# Patient Record
Sex: FEMALE | Race: WHITE | NOT HISPANIC OR LATINO | Employment: OTHER | URBAN - METROPOLITAN AREA
[De-identification: names, ages, dates, MRNs, and addresses within clinical notes are randomized per-mention and may not be internally consistent; named-entity substitution may affect disease eponyms.]

---

## 2021-06-24 DIAGNOSIS — Z11.59 ENCOUNTER FOR SCREENING FOR OTHER VIRAL DISEASES: ICD-10-CM

## 2021-06-25 DIAGNOSIS — Z11.59 ENCOUNTER FOR SCREENING FOR OTHER VIRAL DISEASES: ICD-10-CM

## 2021-06-25 LAB
LABORATORY COMMENT REPORT: NORMAL
SARS-COV-2 RNA RESP QL NAA+PROBE: NEGATIVE
SARS-COV-2 RNA RESP QL NAA+PROBE: NORMAL
SPECIMEN SOURCE: NORMAL
SPECIMEN SOURCE: NORMAL

## 2021-06-25 PROCEDURE — U0005 INFEC AGEN DETEC AMPLI PROBE: HCPCS | Performed by: ORTHOPAEDIC SURGERY

## 2021-06-25 PROCEDURE — U0003 INFECTIOUS AGENT DETECTION BY NUCLEIC ACID (DNA OR RNA); SEVERE ACUTE RESPIRATORY SYNDROME CORONAVIRUS 2 (SARS-COV-2) (CORONAVIRUS DISEASE [COVID-19]), AMPLIFIED PROBE TECHNIQUE, MAKING USE OF HIGH THROUGHPUT TECHNOLOGIES AS DESCRIBED BY CMS-2020-01-R: HCPCS | Performed by: ORTHOPAEDIC SURGERY

## 2021-06-25 RX ORDER — LORAZEPAM 0.5 MG/1
0.5 TABLET ORAL
COMMUNITY
End: 2024-03-11

## 2021-06-25 RX ORDER — CLOPIDOGREL BISULFATE 75 MG/1
75 TABLET ORAL DAILY
COMMUNITY
End: 2024-03-11

## 2021-06-25 RX ORDER — CHLORAL HYDRATE 500 MG
3 CAPSULE ORAL DAILY
COMMUNITY
End: 2024-03-11

## 2021-06-25 RX ORDER — VITAMIN B COMPLEX
TABLET ORAL DAILY
COMMUNITY

## 2021-06-25 RX ORDER — LOSARTAN POTASSIUM 100 MG/1
100 TABLET ORAL DAILY
Status: ON HOLD | COMMUNITY
End: 2021-06-30

## 2021-06-25 RX ORDER — DESONIDE 0.5 MG/G
CREAM TOPICAL 2 TIMES DAILY PRN
COMMUNITY
End: 2024-03-11

## 2021-06-25 RX ORDER — BUSPIRONE HYDROCHLORIDE 15 MG/1
15 TABLET ORAL 3 TIMES DAILY PRN
COMMUNITY

## 2021-06-25 RX ORDER — BUTALBITAL, ACETAMINOPHEN AND CAFFEINE 50; 325; 40 MG/1; MG/1; MG/1
1 TABLET ORAL EVERY 4 HOURS PRN
COMMUNITY

## 2021-06-25 RX ORDER — ESTRADIOL 0.1 MG/G
2 CREAM VAGINAL
COMMUNITY

## 2021-06-25 RX ORDER — ALBUTEROL SULFATE 90 UG/1
2 AEROSOL, METERED RESPIRATORY (INHALATION) 4 TIMES DAILY PRN
COMMUNITY

## 2021-06-25 RX ORDER — ATENOLOL 50 MG/1
50 TABLET ORAL EVERY EVENING
COMMUNITY

## 2021-06-25 RX ORDER — NITROGLYCERIN 0.4 MG/1
0.4 TABLET SUBLINGUAL EVERY 5 MIN PRN
COMMUNITY

## 2021-06-25 RX ORDER — TRIMETHOPRIM 100 MG/1
100 TABLET ORAL DAILY
COMMUNITY

## 2021-06-25 RX ORDER — TRAMADOL HYDROCHLORIDE 50 MG/1
50 TABLET ORAL EVERY 6 HOURS PRN
Status: ON HOLD | COMMUNITY
End: 2021-06-30

## 2021-06-25 RX ORDER — ASPIRIN 81 MG/1
81 TABLET ORAL EVERY EVENING
Status: ON HOLD | COMMUNITY
End: 2021-06-30

## 2021-06-25 RX ORDER — FAMOTIDINE 40 MG/1
40 TABLET, FILM COATED ORAL EVERY EVENING
COMMUNITY

## 2021-06-28 RX ORDER — GABAPENTIN 300 MG/1
600 CAPSULE ORAL AT BEDTIME
COMMUNITY
End: 2024-03-11

## 2021-06-28 NOTE — PROGRESS NOTES
PTA medications updated by Medication Scribe prior to surgery via phone call with patient (last doses completed by Nurse)     Medication history sources: Patient, Surescripts and H&P  In the past week, patient estimated taking medication this percent of the time: Greater than 90%  Adherence assessment: N/A Not Observed    Significant changes made to the medication list:  None      Additional medication history information:   Patient brought own home meds: Albuterol inhaler, Advair inhaler    Medication reconciliation completed by provider prior to medication history? No    Time spent in this activity: 20 minutes    The information provided in this note is only as accurate as the sources available at the time of update(s)      Prior to Admission medications    Medication Sig Last Dose Taking? Auth Provider   albuterol (PROAIR HFA/PROVENTIL HFA/VENTOLIN HFA) 108 (90 Base) MCG/ACT inhaler Inhale 2 puffs into the lungs 4 times daily as needed for shortness of breath / dyspnea or wheezing  at prn Yes Reported, Patient   aluminum chloride (DRYSOL) 20 % external solution Apply topically daily as needed   at prn Yes Reported, Patient   aspirin 81 MG EC tablet Take 81 mg by mouth every evening  6/22/2021 Yes Reported, Patient   atenolol (TENORMIN) 50 MG tablet Take 50 mg by mouth every evening   at pm Yes Reported, Patient   busPIRone (BUSPAR) 15 MG tablet Take 15 mg by mouth 3 times daily as needed  at prn Yes Reported, Patient   butalbital-acetaminophen-caffeine (ESGIC) -40 MG tablet Take 1 tablet by mouth every 4 hours as needed for headaches  at prn Yes Reported, Patient   CALCIUM-VITAMIN D PO Take 2 tablets by mouth daily 6/22/2021 Yes Reported, Patient   clopidogrel (PLAVIX) 75 MG tablet Take 75 mg by mouth daily 6/22/2021 Yes Reported, Patient   desonide (DESOWEN) 0.05 % external cream Apply topically 2 times daily as needed  at prn Yes Reported, Patient   estradiol (ESTRACE) 0.1 MG/GM vaginal cream Place 2 g  vaginally twice a week 6/22/2021 Yes Reported, Patient   famotidine (PEPCID) 40 MG tablet Take 40 mg by mouth every evening   at pm Yes Reported, Patient   fish oil-omega-3 fatty acids 1000 MG capsule Take 3 g by mouth daily 6/22/2021 Yes Reported, Patient   fluticasone-salmeterol (ADVAIR) 250-50 MCG/DOSE inhaler Inhale 1 puff into the lungs every morning   at am Yes Reported, Patient   gabapentin (NEURONTIN) 300 MG capsule Take 600 mg by mouth At Bedtime (2 x 300mg)  at hs Yes Reported, Patient   LORazepam (ATIVAN) 0.5 MG tablet Take 0.5 mg by mouth nightly as needed   at prn Yes Reported, Patient   losartan (COZAAR) 100 MG tablet Take 100 mg by mouth daily  at am Yes Reported, Patient   methylcellulose (CITRUCEL) 500 MG TABS tablet Take 1,000 mg by mouth daily 6/22/2021 Yes Reported, Patient   nitroGLYcerin (NITROSTAT) 0.4 MG sublingual tablet Place 0.4 mg under the tongue every 5 minutes as needed for chest pain For chest pain place 1 tablet under the tongue every 5 minutes for 3 doses. If symptoms persist 5 minutes after 1st dose call 911.  at prn Yes Reported, Patient   traMADol (ULTRAM) 50 MG tablet Take 50 mg by mouth every 6 hours as needed for severe pain  at prn Yes Reported, Patient   trimethoprim (TRIMPEX) 100 MG tablet Take 100 mg by mouth daily  at am Yes Reported, Patient   Vitamin D3 (CHOLECALCIFEROL) 25 mcg (1000 units) tablet Take by mouth daily 6/22/2021 Yes Reported, Patient

## 2021-06-29 ENCOUNTER — ANESTHESIA (OUTPATIENT)
Dept: SURGERY | Facility: CLINIC | Age: 81
End: 2021-06-29
Payer: COMMERCIAL

## 2021-06-29 ENCOUNTER — ANESTHESIA EVENT (OUTPATIENT)
Dept: SURGERY | Facility: CLINIC | Age: 81
End: 2021-06-29
Payer: COMMERCIAL

## 2021-06-29 ENCOUNTER — HOSPITAL ENCOUNTER (OUTPATIENT)
Facility: CLINIC | Age: 81
Discharge: HOME | End: 2021-06-30
Attending: ORTHOPAEDIC SURGERY | Admitting: ORTHOPAEDIC SURGERY
Payer: COMMERCIAL

## 2021-06-29 ENCOUNTER — APPOINTMENT (OUTPATIENT)
Dept: PHYSICAL THERAPY | Facility: CLINIC | Age: 81
End: 2021-06-29
Attending: ORTHOPAEDIC SURGERY
Payer: COMMERCIAL

## 2021-06-29 ENCOUNTER — APPOINTMENT (OUTPATIENT)
Dept: GENERAL RADIOLOGY | Facility: CLINIC | Age: 81
End: 2021-06-29
Attending: ORTHOPAEDIC SURGERY
Payer: COMMERCIAL

## 2021-06-29 DIAGNOSIS — I10 BENIGN ESSENTIAL HYPERTENSION: ICD-10-CM

## 2021-06-29 DIAGNOSIS — Z96.642 STATUS POST TOTAL HIP REPLACEMENT, LEFT: Primary | ICD-10-CM

## 2021-06-29 DIAGNOSIS — Z96.642 STATUS POST TOTAL REPLACEMENT OF LEFT HIP: ICD-10-CM

## 2021-06-29 LAB
ABO + RH BLD: NORMAL
ABO + RH BLD: NORMAL
BLD GP AB SCN SERPL QL: NORMAL
BLOOD BANK CMNT PATIENT-IMP: NORMAL
CREAT SERPL-MCNC: 0.89 MG/DL (ref 0.52–1.04)
GFR SERPL CREATININE-BSD FRML MDRD: 60 ML/MIN/{1.73_M2}
POTASSIUM SERPL-SCNC: 4.2 MMOL/L (ref 3.4–5.3)
SPECIMEN EXP DATE BLD: NORMAL

## 2021-06-29 PROCEDURE — 86901 BLOOD TYPING SEROLOGIC RH(D): CPT | Performed by: ANESTHESIOLOGY

## 2021-06-29 PROCEDURE — 36415 COLL VENOUS BLD VENIPUNCTURE: CPT | Performed by: ANESTHESIOLOGY

## 2021-06-29 PROCEDURE — 999N000141 HC STATISTIC PRE-PROCEDURE NURSING ASSESSMENT: Performed by: ORTHOPAEDIC SURGERY

## 2021-06-29 PROCEDURE — 250N000011 HC RX IP 250 OP 636: Performed by: REGISTERED NURSE

## 2021-06-29 PROCEDURE — 99219 PR INITIAL OBSERVATION CARE,LEVEL II: CPT | Performed by: PHYSICIAN ASSISTANT

## 2021-06-29 PROCEDURE — 370N000017 HC ANESTHESIA TECHNICAL FEE, PER MIN: Performed by: ORTHOPAEDIC SURGERY

## 2021-06-29 PROCEDURE — 258N000003 HC RX IP 258 OP 636: Performed by: PHYSICIAN ASSISTANT

## 2021-06-29 PROCEDURE — 86850 RBC ANTIBODY SCREEN: CPT | Performed by: ANESTHESIOLOGY

## 2021-06-29 PROCEDURE — 82565 ASSAY OF CREATININE: CPT | Performed by: ANESTHESIOLOGY

## 2021-06-29 PROCEDURE — 97161 PT EVAL LOW COMPLEX 20 MIN: CPT | Mod: GP | Performed by: PHYSICAL THERAPIST

## 2021-06-29 PROCEDURE — 258N000003 HC RX IP 258 OP 636: Performed by: ORTHOPAEDIC SURGERY

## 2021-06-29 PROCEDURE — 97116 GAIT TRAINING THERAPY: CPT | Mod: GP | Performed by: PHYSICAL THERAPIST

## 2021-06-29 PROCEDURE — 250N000011 HC RX IP 250 OP 636: Performed by: ORTHOPAEDIC SURGERY

## 2021-06-29 PROCEDURE — 250N000011 HC RX IP 250 OP 636: Performed by: ANESTHESIOLOGY

## 2021-06-29 PROCEDURE — 250N000009 HC RX 250: Performed by: ORTHOPAEDIC SURGERY

## 2021-06-29 PROCEDURE — 258N000003 HC RX IP 258 OP 636: Performed by: REGISTERED NURSE

## 2021-06-29 PROCEDURE — 258N000001 HC RX 258: Performed by: ORTHOPAEDIC SURGERY

## 2021-06-29 PROCEDURE — 710N000009 HC RECOVERY PHASE 1, LEVEL 1, PER MIN: Performed by: ORTHOPAEDIC SURGERY

## 2021-06-29 PROCEDURE — 250N000009 HC RX 250: Performed by: REGISTERED NURSE

## 2021-06-29 PROCEDURE — 97530 THERAPEUTIC ACTIVITIES: CPT | Mod: GP | Performed by: PHYSICAL THERAPIST

## 2021-06-29 PROCEDURE — 99207 PR CONSULT E&M CHANGED TO INITIAL LEVEL: CPT | Performed by: PHYSICIAN ASSISTANT

## 2021-06-29 PROCEDURE — 84132 ASSAY OF SERUM POTASSIUM: CPT | Performed by: ANESTHESIOLOGY

## 2021-06-29 PROCEDURE — C1713 ANCHOR/SCREW BN/BN,TIS/BN: HCPCS | Performed by: ORTHOPAEDIC SURGERY

## 2021-06-29 PROCEDURE — 272N000001 HC OR GENERAL SUPPLY STERILE: Performed by: ORTHOPAEDIC SURGERY

## 2021-06-29 PROCEDURE — C1776 JOINT DEVICE (IMPLANTABLE): HCPCS | Performed by: ORTHOPAEDIC SURGERY

## 2021-06-29 PROCEDURE — 360N000077 HC SURGERY LEVEL 4, PER MIN: Performed by: ORTHOPAEDIC SURGERY

## 2021-06-29 PROCEDURE — 250N000013 HC RX MED GY IP 250 OP 250 PS 637: Performed by: PHYSICIAN ASSISTANT

## 2021-06-29 PROCEDURE — 250N000011 HC RX IP 250 OP 636: Performed by: PHYSICIAN ASSISTANT

## 2021-06-29 PROCEDURE — 86900 BLOOD TYPING SEROLOGIC ABO: CPT | Performed by: ANESTHESIOLOGY

## 2021-06-29 PROCEDURE — 999N000063 XR PELVIS AND HIP PORTABLE LEFT 1 VIEW

## 2021-06-29 PROCEDURE — 97110 THERAPEUTIC EXERCISES: CPT | Mod: GP | Performed by: PHYSICAL THERAPIST

## 2021-06-29 PROCEDURE — 258N000003 HC RX IP 258 OP 636: Performed by: ANESTHESIOLOGY

## 2021-06-29 PROCEDURE — 250N000013 HC RX MED GY IP 250 OP 250 PS 637: Performed by: ORTHOPAEDIC SURGERY

## 2021-06-29 DEVICE — IMP SHELL SNR ACET R3 3H 50MM 71335550: Type: IMPLANTABLE DEVICE | Site: HIP | Status: FUNCTIONAL

## 2021-06-29 DEVICE — REFLECTION SPHERICAL HEAD SCREW 25MM
Type: IMPLANTABLE DEVICE | Site: HIP | Status: FUNCTIONAL
Brand: REFLECTION

## 2021-06-29 DEVICE — R3 +4MM XLPE LINER 36MM ID X 50MM OD
Type: IMPLANTABLE DEVICE | Site: HIP | Status: FUNCTIONAL
Brand: R3

## 2021-06-29 DEVICE — IMPLANTABLE DEVICE: Type: IMPLANTABLE DEVICE | Site: HIP | Status: FUNCTIONAL

## 2021-06-29 DEVICE — IMP HEAD FEMORAL SNR COBALT 36MM -3 71303603: Type: IMPLANTABLE DEVICE | Site: HIP | Status: FUNCTIONAL

## 2021-06-29 RX ORDER — ASPIRIN 81 MG/1
81 TABLET ORAL 2 TIMES DAILY
Status: DISCONTINUED | OUTPATIENT
Start: 2021-06-29 | End: 2021-06-30

## 2021-06-29 RX ORDER — HYDROMORPHONE HYDROCHLORIDE 4 MG/1
4 TABLET ORAL EVERY 4 HOURS PRN
Status: DISCONTINUED | OUTPATIENT
Start: 2021-06-29 | End: 2021-06-30 | Stop reason: HOSPADM

## 2021-06-29 RX ORDER — ACETAMINOPHEN 325 MG/1
650 TABLET ORAL EVERY 4 HOURS PRN
Qty: 100 TABLET | Refills: 0 | Status: ON HOLD | OUTPATIENT
Start: 2021-06-29 | End: 2024-03-12

## 2021-06-29 RX ORDER — ONDANSETRON 4 MG/1
4 TABLET, ORALLY DISINTEGRATING ORAL EVERY 30 MIN PRN
Status: DISCONTINUED | OUTPATIENT
Start: 2021-06-29 | End: 2021-06-29 | Stop reason: HOSPADM

## 2021-06-29 RX ORDER — NALOXONE HYDROCHLORIDE 0.4 MG/ML
0.4 INJECTION, SOLUTION INTRAMUSCULAR; INTRAVENOUS; SUBCUTANEOUS
Status: DISCONTINUED | OUTPATIENT
Start: 2021-06-29 | End: 2021-06-30 | Stop reason: HOSPADM

## 2021-06-29 RX ORDER — GABAPENTIN 300 MG/1
600 CAPSULE ORAL AT BEDTIME
Status: DISCONTINUED | OUTPATIENT
Start: 2021-06-29 | End: 2021-06-30 | Stop reason: HOSPADM

## 2021-06-29 RX ORDER — HYDROXYZINE HYDROCHLORIDE 10 MG/1
10 TABLET, FILM COATED ORAL EVERY 6 HOURS PRN
Status: DISCONTINUED | OUTPATIENT
Start: 2021-06-29 | End: 2021-06-30 | Stop reason: HOSPADM

## 2021-06-29 RX ORDER — AMOXICILLIN 250 MG
1 CAPSULE ORAL 2 TIMES DAILY
Status: DISCONTINUED | OUTPATIENT
Start: 2021-06-29 | End: 2021-06-30 | Stop reason: HOSPADM

## 2021-06-29 RX ORDER — CEFAZOLIN SODIUM 2 G/100ML
2 INJECTION, SOLUTION INTRAVENOUS
Status: COMPLETED | OUTPATIENT
Start: 2021-06-29 | End: 2021-06-29

## 2021-06-29 RX ORDER — ACETAMINOPHEN 325 MG/1
975 TABLET ORAL EVERY 8 HOURS
Status: DISCONTINUED | OUTPATIENT
Start: 2021-06-29 | End: 2021-06-30 | Stop reason: HOSPADM

## 2021-06-29 RX ORDER — BUSPIRONE HYDROCHLORIDE 15 MG/1
15 TABLET ORAL 3 TIMES DAILY
Status: DISCONTINUED | OUTPATIENT
Start: 2021-06-29 | End: 2021-06-30 | Stop reason: HOSPADM

## 2021-06-29 RX ORDER — MAGNESIUM HYDROXIDE 1200 MG/15ML
LIQUID ORAL PRN
Status: DISCONTINUED | OUTPATIENT
Start: 2021-06-29 | End: 2021-06-29 | Stop reason: HOSPADM

## 2021-06-29 RX ORDER — ATENOLOL 50 MG/1
50 TABLET ORAL EVERY EVENING
Status: DISCONTINUED | OUTPATIENT
Start: 2021-06-29 | End: 2021-06-30 | Stop reason: HOSPADM

## 2021-06-29 RX ORDER — FAMOTIDINE 20 MG/1
40 TABLET, FILM COATED ORAL EVERY EVENING
Status: DISCONTINUED | OUTPATIENT
Start: 2021-06-29 | End: 2021-06-30 | Stop reason: HOSPADM

## 2021-06-29 RX ORDER — SODIUM CHLORIDE, SODIUM LACTATE, POTASSIUM CHLORIDE, CALCIUM CHLORIDE 600; 310; 30; 20 MG/100ML; MG/100ML; MG/100ML; MG/100ML
INJECTION, SOLUTION INTRAVENOUS CONTINUOUS
Status: DISCONTINUED | OUTPATIENT
Start: 2021-06-29 | End: 2021-06-29 | Stop reason: HOSPADM

## 2021-06-29 RX ORDER — NALOXONE HYDROCHLORIDE 0.4 MG/ML
0.2 INJECTION, SOLUTION INTRAMUSCULAR; INTRAVENOUS; SUBCUTANEOUS
Status: DISCONTINUED | OUTPATIENT
Start: 2021-06-29 | End: 2021-06-30 | Stop reason: HOSPADM

## 2021-06-29 RX ORDER — LOSARTAN POTASSIUM 100 MG/1
100 TABLET ORAL DAILY
Status: DISCONTINUED | OUTPATIENT
Start: 2021-06-30 | End: 2021-06-30 | Stop reason: HOSPADM

## 2021-06-29 RX ORDER — ONDANSETRON 4 MG/1
4 TABLET, ORALLY DISINTEGRATING ORAL EVERY 6 HOURS PRN
Status: DISCONTINUED | OUTPATIENT
Start: 2021-06-29 | End: 2021-06-30 | Stop reason: HOSPADM

## 2021-06-29 RX ORDER — DOCUSATE SODIUM 100 MG/1
100 CAPSULE, LIQUID FILLED ORAL 2 TIMES DAILY
Status: DISCONTINUED | OUTPATIENT
Start: 2021-06-29 | End: 2021-06-30 | Stop reason: HOSPADM

## 2021-06-29 RX ORDER — LORAZEPAM 0.5 MG/1
0.5 TABLET ORAL
Status: DISCONTINUED | OUTPATIENT
Start: 2021-06-29 | End: 2021-06-30 | Stop reason: HOSPADM

## 2021-06-29 RX ORDER — PROCHLORPERAZINE MALEATE 5 MG
5 TABLET ORAL EVERY 6 HOURS PRN
Status: DISCONTINUED | OUTPATIENT
Start: 2021-06-29 | End: 2021-06-30 | Stop reason: HOSPADM

## 2021-06-29 RX ORDER — FENTANYL CITRATE 50 UG/ML
25-50 INJECTION, SOLUTION INTRAMUSCULAR; INTRAVENOUS
Status: DISCONTINUED | OUTPATIENT
Start: 2021-06-29 | End: 2021-06-29 | Stop reason: HOSPADM

## 2021-06-29 RX ORDER — BISACODYL 10 MG
10 SUPPOSITORY, RECTAL RECTAL DAILY PRN
Status: DISCONTINUED | OUTPATIENT
Start: 2021-06-29 | End: 2021-06-30 | Stop reason: HOSPADM

## 2021-06-29 RX ORDER — EPHEDRINE SULFATE 50 MG/ML
INJECTION, SOLUTION INTRAMUSCULAR; INTRAVENOUS; SUBCUTANEOUS PRN
Status: DISCONTINUED | OUTPATIENT
Start: 2021-06-29 | End: 2021-06-29

## 2021-06-29 RX ORDER — ONDANSETRON 2 MG/ML
4 INJECTION INTRAMUSCULAR; INTRAVENOUS EVERY 6 HOURS PRN
Status: DISCONTINUED | OUTPATIENT
Start: 2021-06-29 | End: 2021-06-30 | Stop reason: HOSPADM

## 2021-06-29 RX ORDER — ALBUTEROL SULFATE 90 UG/1
2 AEROSOL, METERED RESPIRATORY (INHALATION) 4 TIMES DAILY PRN
Status: DISCONTINUED | OUTPATIENT
Start: 2021-06-29 | End: 2021-06-30 | Stop reason: HOSPADM

## 2021-06-29 RX ORDER — CEFAZOLIN SODIUM 1 G/3ML
1 INJECTION, POWDER, FOR SOLUTION INTRAMUSCULAR; INTRAVENOUS EVERY 8 HOURS
Status: COMPLETED | OUTPATIENT
Start: 2021-06-29 | End: 2021-06-30

## 2021-06-29 RX ORDER — TRANEXAMIC ACID 650 MG/1
1950 TABLET ORAL ONCE
Status: COMPLETED | OUTPATIENT
Start: 2021-06-29 | End: 2021-06-29

## 2021-06-29 RX ORDER — POLYETHYLENE GLYCOL 3350 17 G/17G
17 POWDER, FOR SOLUTION ORAL DAILY
Status: DISCONTINUED | OUTPATIENT
Start: 2021-06-30 | End: 2021-06-30 | Stop reason: HOSPADM

## 2021-06-29 RX ORDER — HYDROXYZINE HYDROCHLORIDE 10 MG/1
10 TABLET, FILM COATED ORAL EVERY 6 HOURS PRN
Qty: 30 TABLET | Refills: 0 | Status: SHIPPED | OUTPATIENT
Start: 2021-06-29 | End: 2024-03-11

## 2021-06-29 RX ORDER — HYDROMORPHONE HYDROCHLORIDE 1 MG/ML
.3-.5 INJECTION, SOLUTION INTRAMUSCULAR; INTRAVENOUS; SUBCUTANEOUS EVERY 5 MIN PRN
Status: DISCONTINUED | OUTPATIENT
Start: 2021-06-29 | End: 2021-06-29 | Stop reason: HOSPADM

## 2021-06-29 RX ORDER — CLOPIDOGREL BISULFATE 75 MG/1
75 TABLET ORAL DAILY
Status: DISCONTINUED | OUTPATIENT
Start: 2021-06-30 | End: 2021-06-30 | Stop reason: HOSPADM

## 2021-06-29 RX ORDER — ONDANSETRON 2 MG/ML
4 INJECTION INTRAMUSCULAR; INTRAVENOUS EVERY 30 MIN PRN
Status: DISCONTINUED | OUTPATIENT
Start: 2021-06-29 | End: 2021-06-29 | Stop reason: HOSPADM

## 2021-06-29 RX ORDER — TRIMETHOPRIM 100 MG/1
100 TABLET ORAL DAILY
Status: DISCONTINUED | OUTPATIENT
Start: 2021-06-29 | End: 2021-06-30 | Stop reason: HOSPADM

## 2021-06-29 RX ORDER — HYDROMORPHONE HYDROCHLORIDE 2 MG/1
2 TABLET ORAL EVERY 4 HOURS PRN
Qty: 40 TABLET | Refills: 0 | Status: SHIPPED | OUTPATIENT
Start: 2021-06-29 | End: 2021-06-30

## 2021-06-29 RX ORDER — ACETAMINOPHEN 325 MG/1
650 TABLET ORAL EVERY 4 HOURS PRN
Status: DISCONTINUED | OUTPATIENT
Start: 2021-07-02 | End: 2021-06-30 | Stop reason: HOSPADM

## 2021-06-29 RX ORDER — CEFAZOLIN SODIUM 2 G/100ML
2 INJECTION, SOLUTION INTRAVENOUS SEE ADMIN INSTRUCTIONS
Status: DISCONTINUED | OUTPATIENT
Start: 2021-06-29 | End: 2021-06-29 | Stop reason: HOSPADM

## 2021-06-29 RX ORDER — SODIUM CHLORIDE, SODIUM LACTATE, POTASSIUM CHLORIDE, CALCIUM CHLORIDE 600; 310; 30; 20 MG/100ML; MG/100ML; MG/100ML; MG/100ML
INJECTION, SOLUTION INTRAVENOUS CONTINUOUS
Status: DISCONTINUED | OUTPATIENT
Start: 2021-06-29 | End: 2021-06-30 | Stop reason: HOSPADM

## 2021-06-29 RX ORDER — AMOXICILLIN 250 MG
1-2 CAPSULE ORAL 2 TIMES DAILY
Qty: 30 TABLET | Refills: 0 | Status: SHIPPED | OUTPATIENT
Start: 2021-06-29 | End: 2024-03-11

## 2021-06-29 RX ORDER — PROPOFOL 10 MG/ML
INJECTION, EMULSION INTRAVENOUS CONTINUOUS PRN
Status: DISCONTINUED | OUTPATIENT
Start: 2021-06-29 | End: 2021-06-29

## 2021-06-29 RX ORDER — PROPOFOL 10 MG/ML
INJECTION, EMULSION INTRAVENOUS PRN
Status: DISCONTINUED | OUTPATIENT
Start: 2021-06-29 | End: 2021-06-29

## 2021-06-29 RX ORDER — HYDROMORPHONE HYDROCHLORIDE 2 MG/1
2 TABLET ORAL EVERY 4 HOURS PRN
Status: DISCONTINUED | OUTPATIENT
Start: 2021-06-29 | End: 2021-06-30 | Stop reason: HOSPADM

## 2021-06-29 RX ORDER — ASPIRIN 81 MG/1
81 TABLET ORAL 2 TIMES DAILY
Qty: 60 TABLET | Refills: 0 | Status: ON HOLD | OUTPATIENT
Start: 2021-06-29 | End: 2024-03-12

## 2021-06-29 RX ORDER — LOSARTAN POTASSIUM 100 MG/1
100 TABLET ORAL DAILY
Status: DISCONTINUED | OUTPATIENT
Start: 2021-06-29 | End: 2021-06-29

## 2021-06-29 RX ORDER — HYDROMORPHONE HCL IN WATER/PF 6 MG/30 ML
0.2 PATIENT CONTROLLED ANALGESIA SYRINGE INTRAVENOUS
Status: DISCONTINUED | OUTPATIENT
Start: 2021-06-29 | End: 2021-06-30 | Stop reason: HOSPADM

## 2021-06-29 RX ORDER — HYDROMORPHONE HCL IN WATER/PF 6 MG/30 ML
0.4 PATIENT CONTROLLED ANALGESIA SYRINGE INTRAVENOUS
Status: DISCONTINUED | OUTPATIENT
Start: 2021-06-29 | End: 2021-06-30 | Stop reason: HOSPADM

## 2021-06-29 RX ORDER — LIDOCAINE 40 MG/G
CREAM TOPICAL
Status: DISCONTINUED | OUTPATIENT
Start: 2021-06-29 | End: 2021-06-29

## 2021-06-29 RX ORDER — ONDANSETRON 2 MG/ML
INJECTION INTRAMUSCULAR; INTRAVENOUS PRN
Status: DISCONTINUED | OUTPATIENT
Start: 2021-06-29 | End: 2021-06-29

## 2021-06-29 RX ADMIN — FAMOTIDINE 40 MG: 20 TABLET ORAL at 20:28

## 2021-06-29 RX ADMIN — SODIUM CHLORIDE, POTASSIUM CHLORIDE, SODIUM LACTATE AND CALCIUM CHLORIDE: 600; 310; 30; 20 INJECTION, SOLUTION INTRAVENOUS at 10:18

## 2021-06-29 RX ADMIN — Medication 10 MG: at 11:44

## 2021-06-29 RX ADMIN — PHENYLEPHRINE HYDROCHLORIDE 100 MCG: 10 INJECTION INTRAVENOUS at 12:46

## 2021-06-29 RX ADMIN — ACETAMINOPHEN 975 MG: 325 TABLET, FILM COATED ORAL at 17:37

## 2021-06-29 RX ADMIN — PHENYLEPHRINE HYDROCHLORIDE 100 MCG: 10 INJECTION INTRAVENOUS at 12:12

## 2021-06-29 RX ADMIN — Medication 5 MG: at 12:03

## 2021-06-29 RX ADMIN — BUSPIRONE HYDROCHLORIDE 15 MG: 15 TABLET ORAL at 22:02

## 2021-06-29 RX ADMIN — GABAPENTIN 600 MG: 300 CAPSULE ORAL at 22:02

## 2021-06-29 RX ADMIN — Medication 10 MG: at 11:53

## 2021-06-29 RX ADMIN — TRANEXAMIC ACID 1950 MG: 650 TABLET ORAL at 10:06

## 2021-06-29 RX ADMIN — HYDROMORPHONE HYDROCHLORIDE 0.5 MG: 1 INJECTION, SOLUTION INTRAMUSCULAR; INTRAVENOUS; SUBCUTANEOUS at 14:15

## 2021-06-29 RX ADMIN — PROPOFOL 20 MG: 10 INJECTION, EMULSION INTRAVENOUS at 11:41

## 2021-06-29 RX ADMIN — ATENOLOL 50 MG: 50 TABLET ORAL at 20:28

## 2021-06-29 RX ADMIN — ASPIRIN 81 MG: 81 TABLET, COATED ORAL at 20:28

## 2021-06-29 RX ADMIN — SENNOSIDES AND DOCUSATE SODIUM 1 TABLET: 8.6; 5 TABLET ORAL at 20:28

## 2021-06-29 RX ADMIN — PROPOFOL 75 MCG/KG/MIN: 10 INJECTION, EMULSION INTRAVENOUS at 11:43

## 2021-06-29 RX ADMIN — CEFAZOLIN SODIUM 2 G: 2 INJECTION, SOLUTION INTRAVENOUS at 11:45

## 2021-06-29 RX ADMIN — ONDANSETRON 4 MG: 2 INJECTION INTRAMUSCULAR; INTRAVENOUS at 13:02

## 2021-06-29 RX ADMIN — PROPOFOL 20 MG: 10 INJECTION, EMULSION INTRAVENOUS at 12:37

## 2021-06-29 RX ADMIN — CEFAZOLIN 1 G: 1 INJECTION, POWDER, FOR SOLUTION INTRAMUSCULAR; INTRAVENOUS at 20:29

## 2021-06-29 RX ADMIN — SODIUM CHLORIDE, POTASSIUM CHLORIDE, SODIUM LACTATE AND CALCIUM CHLORIDE: 600; 310; 30; 20 INJECTION, SOLUTION INTRAVENOUS at 21:59

## 2021-06-29 RX ADMIN — PROPOFOL 20 MG: 10 INJECTION, EMULSION INTRAVENOUS at 11:38

## 2021-06-29 RX ADMIN — DOCUSATE SODIUM 100 MG: 100 CAPSULE, LIQUID FILLED ORAL at 20:28

## 2021-06-29 RX ADMIN — PHENYLEPHRINE HYDROCHLORIDE 100 MCG: 10 INJECTION INTRAVENOUS at 12:26

## 2021-06-29 RX ADMIN — BUSPIRONE HYDROCHLORIDE 15 MG: 15 TABLET ORAL at 18:10

## 2021-06-29 ASSESSMENT — LIFESTYLE VARIABLES: TOBACCO_USE: 1

## 2021-06-29 ASSESSMENT — MIFFLIN-ST. JEOR: SCORE: 1042.84

## 2021-06-29 ASSESSMENT — COPD QUESTIONNAIRES: COPD: 1

## 2021-06-29 NOTE — ANESTHESIA PROCEDURE NOTES
Intrathecal Procedure Note  Pre-Procedure   Staff -        Anesthesiologist:  Rubén Bernabe MD       Performed By: anesthesiologist       Location: OR       Pre-Anesthestic Checklist: patient identified, IV checked, site marked, risks and benefits discussed, informed consent, monitors and equipment checked, pre-op evaluation and at physician/surgeon's request  Timeout:       Correct Patient: Yes        Correct Procedure: Yes        Correct Site: Yes        Correct Position: Yes   Procedure Documentation  Procedure: intrathecal       Patient Position: sitting       Skin prep: Betadine       Insertion Site: L2-3. (midline approach).       Spinal Needle Type: Lyn tip       Introducer used       Introducer: 20 G      # of attempts: 1 and  # of redirects:     Assessment/Narrative         Paresthesias: No.       CSF fluid: clear.      Opening pressure was cmH2O while  Sitting.      Comments:  Patient sitting on edge of OR bed, lower back cleaned and prepped in sterile fashion with betadine. 1% lido used to numb area. Introducer placed, spinal needle through introducer. Appropriate flow of CSF and confirmed with aspiration via syringe. Spinal dose given, 12 mg 0.75% bupivacaine. No complications.

## 2021-06-29 NOTE — ANESTHESIA CARE TRANSFER NOTE
Patient: Charisse Dumas    Procedure(s):  LEFT TOTAL HIP ARTHROPLASTY    Diagnosis: Osteoarthritis of left hip [M16.12]  Diagnosis Additional Information: No value filed.    Anesthesia Type:   Spinal     Note:    Oropharynx: oropharynx clear of all foreign objects  Level of Consciousness: awake  Oxygen Supplementation: face mask  Level of Supplemental Oxygen (L/min / FiO2): 8  Independent Airway: airway patency satisfactory and stable  Dentition: dentition unchanged  Vital Signs Stable: post-procedure vital signs reviewed and stable  Report to RN Given: handoff report given  Patient transferred to: PACU  Comments: VSS, spontaneously breathing with sats %.  To PACU, monitors on, report to RN.  Handoff Report: Identifed the Patient, Identified the Reponsible Provider, Reviewed the pertinent medical history, Discussed the surgical course, Reviewed Intra-OP anesthesia mangement and issues during anesthesia, Set expectations for post-procedure period and Allowed opportunity for questions and acknowledgement of understanding      Vitals: (Last set prior to Anesthesia Care Transfer)  CRNA VITALS  6/29/2021 1241 - 6/29/2021 1316      6/29/2021             Resp Rate (set):  10        Electronically Signed By: JUAN PABLO Jarrell CRNA  June 29, 2021  1:16 PM

## 2021-06-29 NOTE — ANESTHESIA PREPROCEDURE EVALUATION
Anesthesia Pre-Procedure Evaluation    Patient: Charisse Dumas   MRN: [id number] : 1940        Preoperative Diagnosis: Osteoarthritis of left hip [M16.12]   Procedure : Procedure(s):  LEFT TOTAL HIP ARTHROPLASTY     Past Medical History:   Diagnosis Date     Ascending aortic aneurysm (H)      Carotid artery stenosis      COPD (chronic obstructive pulmonary disease) (H)      Coronary artery disease      Degenerative joint disease      Diverticulitis      Gastroesophageal reflux disease      Generalized anxiety disorder with panic attacks      GI bleed due to NSAIDs      Heart murmur     mitral valve disorder     Hypertension      Migraine with aura      Mixed hyperlipidemia      Recurrent UTI      Spondylolisthesis of lumbosacral region       Past Surgical History:   Procedure Laterality Date     APPENDECTOMY       BREAST SURGERY       CARDIAC SURGERY  ,     stent placement LUDIVINA to RCA     CARDIAC SURGERY      angiogramc     CHOLECYSTECTOMY       COLONOSCOPY       ENT SURGERY       GENITOURINARY SURGERY       HEAD & NECK SURGERY      closed skull fracture     ORTHOPEDIC SURGERY        Allergies   Allergen Reactions     Celebrex [Celecoxib] Unknown     Contrast Dye      Hydrochlorothiazide      hyponatremia     Indomethacin      Lidocaine Swelling     Lopid [Gemfibrozil]      paradoxical increase in lipids     Statins [Hmg-Coa-R Inhibitors]      myalgia     Sulfa Drugs Itching     Tachycardia       Tricor [Fenofibrate]      Vioxx [Rofecoxib]      Ciprofloxacin Rash     Folic Acid-Vit B6-Vit B12 Rash     Foltx Rash     Nystatin Rash      Social History     Tobacco Use     Smoking status: Former Smoker     Types: Cigarettes     Start date:      Quit date:      Years since quittin.5     Smokeless tobacco: Never Used   Substance Use Topics     Alcohol use: Not on file     Comment: occasional      Wt Readings from Last 1 Encounters:   No data found for Wt        Anesthesia Evaluation    Pt has had prior anesthetic. Type: General.    No history of anesthetic complications       ROS/MED HX  ENT/Pulmonary:     (+) tobacco use (Quit in '93), Past use, 45  Pack-Year Hx,  COPD,     Neurologic:     (+) migraines,     Cardiovascular: Comment: Ascending ao aneurysm    (+) Dyslipidemia hypertension-Peripheral Vascular Disease-- Carotid Stenosis. CAD ---Taking blood thinners Instructions Given to patient: Plavix. valvular problems/murmurs type: MR Trace MR. Previous cardiac testing   Echo: Date: 5/20/21 Results:  Final Impressions:   1. Normal LV size, normal wall thickness, normal global systolic function with an estimated EF of 60 - 65%.   2. Mildly enlarged left atrium.   3. The aortic valve is sclerotic, no stenosis and mild regurgitation.   4. The mitral valve is sclerotic, trace mitral regurgitation.   5. The ascending aorta is dilated with a maximal diameter of 4.6 cm.    Chamber Sizes and Function  Normal left ventricular size, normal wall thickness, normal global systolic function with an estimated EF of 60 - 65%. Left atrial size is mildly enlarged. Right ventricular cavity size is normal, global systolic RV function is normal. The right atrium is mildly enlarged. Right atrial volume index is 32 ml/mÂ . Right atrial area is 18 cmÂ . The pulmonary artery is not well visualized. The sinus of Valsalva is normal sized. The ascending aorta is dilated.    Valves, RV Pressures and Diastolic Function  The aortic valve is sclerotic, no stenosis and mild regurgitation. The mitral valve is sclerotic, trace mitral regurgitation. Mild LV diastolic dysfunction. The tricuspid valve is normal in structure. Tricuspid regurgitation is mild regurgitation. The tricuspid regurgitant velocity is 2.7 m/s, the estimated right ventricular systolic pressure is 28 mmHg plus right atrial pressure. The pulmonic valve is not well visualized. Trace pulmonary regurgitation.    Masses, Effusion, Shunts  There is no pericardial  effusion. The inferior vena cava is normal sized, respiratory size variation greater than 50%. No left to right shunting was detected by limited color flow Doppler interrogation of the interatrial septum.  Stress Test: Date: Results:    ECG Reviewed: Date: 6/28/21 Results:  SR w/ PAC  Cath: Date: Results:      METS/Exercise Tolerance:     Hematologic:       Musculoskeletal:       GI/Hepatic: Comment: Diverticulitis    (+) GERD,     Renal/Genitourinary:       Endo:       Psychiatric/Substance Use:     (+) psychiatric history anxiety and depression     Infectious Disease:       Malignancy:       Other:               OUTSIDE LABS:  CBC:   Lab Results   Component Value Date    WBC 10.0 04/29/2011    HGB 14.3 04/29/2011    HCT 41.1 04/29/2011     04/29/2011     BMP:   Lab Results   Component Value Date     04/29/2011    POTASSIUM 3.8 04/29/2011    CHLORIDE 96 04/29/2011    CO2 30 04/29/2011    BUN 13 04/29/2011    CR 0.45 (L) 04/29/2011     (H) 04/29/2011     COAGS: No results found for: PTT, INR, FIBR  POC: No results found for: BGM, HCG, HCGS  HEPATIC:   Lab Results   Component Value Date    ALBUMIN 4.4 04/29/2011    PROTTOTAL 7.3 04/29/2011    ALT 26 04/29/2011    AST 44 04/29/2011    ALKPHOS 116 04/29/2011    BILITOTAL 0.4 04/29/2011     OTHER:   Lab Results   Component Value Date    TIA 9.5 04/29/2011    LIPASE 90 04/29/2011       Anesthesia Plan    ASA Status:  3      Anesthesia Type: Spinal.              Consents    Anesthesia Plan(s) and associated risks, benefits, and realistic alternatives discussed. Questions answered and patient/representative(s) expressed understanding.     - Discussed with:  Patient         Postoperative Care    Pain management: IV analgesics, Multi-modal analgesia, Neuraxial analgesia.   PONV prophylaxis: Ondansetron (or other 5HT-3)     Comments:                Rubén Bernabe MD

## 2021-06-29 NOTE — ANESTHESIA POSTPROCEDURE EVALUATION
Patient: Charisse Dumas    Procedure(s):  LEFT TOTAL HIP ARTHROPLASTY    Diagnosis:Osteoarthritis of left hip [M16.12]  Diagnosis Additional Information: No value filed.    Anesthesia Type:  Spinal    Note:     Postop Pain Control: Uneventful            Sign Out: Well controlled pain   PONV: No   Neuro/Psych: Uneventful            Sign Out: Acceptable/Baseline neuro status   Airway/Respiratory: Uneventful            Sign Out: Acceptable/Baseline resp. status   CV/Hemodynamics: Uneventful            Sign Out: Acceptable CV status; No obvious hypovolemia; No obvious fluid overload   Other NRE: NONE   DID A NON-ROUTINE EVENT OCCUR? No           Last vitals:  Vitals:    06/29/21 1340 06/29/21 1350 06/29/21 1400   BP: 98/51 123/72 128/55   Pulse: 53 51 52   Resp: 19 10 17   Temp: 36.3  C (97.4  F)     SpO2: 95% 97% 95%       Last vitals prior to Anesthesia Care Transfer:  CRNA VITALS  6/29/2021 1241 - 6/29/2021 1341      6/29/2021             Resp Rate (set):  10    EKG:  Sinus bradycardia;Sinus rhythm          Electronically Signed By: Rubén Bernabe MD  June 29, 2021  2:15 PM

## 2021-06-29 NOTE — PROGRESS NOTES
Pt's belongings including clothes, shoes, cane, cell phone, brad and copy of her Advanced Care Directive for hospital records sent to PACU.

## 2021-06-29 NOTE — PROGRESS NOTES
Pt arrived from PACU denies pain.  Hip drsg dry and intact and HVC intact.  Ice to hip.  CMS good bilaterally to ft.  Denies urge to void. Denies  Nausea.

## 2021-06-29 NOTE — CONSULTS
[facility]  Consult Note - Hospitalist Service     Date of Admission:  6/29/2021  Consult Requested by: Orthopedic surgery  Reason for Consult: medical comanagement (HTN, CAD, HLD)    Assessment & Plan   Charisse Dumas is a 80 year old female with PMH significant for CAD s/p PCI, HTN, HLD, PVD, anxiety, mild COPD among others who was admitted to [facility] on 6/29/2021 by the orthopedic surgery service for scheduled left total hip arthroplasty with Dr. Dial.     Preoperative COVID-19 admission screen: Negative 6/25/21    Osteoarthritis s/p left total hip arthroplasty 6/29  Surgery with Dr. Dial. Properative Hgb 14.4. Holding DAPT x6 days PTA.  - Routine postoperative cares per primary service, including IVF, pain control, abx, DVT ppx, and disposition  - PT/OT as per primary service  - Aggressive pulmonary toilet, frequent IS use 10x/hour while awake  - Hgb in AM    Coronary artery disease s/p LUDIVINA to RCA 2005, 2013  Essential hypertension  Mixed hyperlipidemia  PTA regimen: losartan 100mg/d, PRN nitroglycerin, atenolol 50mg Q HS  DAPT (ASA, clopidogrel) held x6 days PTA. No recent anginal symptoms.   Hx RCA in-stent restenosis 2013.   Last ECHO 5/2021: normal LV size and function, EF 60%, aortic sclerosis, no stenosis, mild aortic insufficiency, trace MR, ascending aorta 4.6cm.  - Continue PTA regimen with hold parameters  - Resumed DAPT, per ortho, orders for ASA POD #0 and clopidogrel starting POD #1  - ASA ordered as 81mg BID - need to confirm on discharge will not want to adjust dose given addition of clopidogrel  - Baseline SBPs 110s-120s  - Failed multiple medications for hyperlipidemia, cardiology following and aware of very elevated lipid panel  - Cardiac diet  - Telemetry  - BMP in AM    Anxiety disorder with panic attacks: Continue PTA buspar 15mg TID and lorazepam 0.5mg nightly PRN    Mild COPD: Continue PTA PRN albuterol    Chronic stable  diagnoses: Appropriate PTA medications will be resumed.  Hx of heavy tobacco use - 45 pack years  Peripheral vascular disease  Migraines  Seasonal allergies  Insomnia  GERD  Carotid artery stenosis  Ascending aortic aneurysm  History of GI bleed due to NSAIDs  Diverticulitis  Recurrent UTI  Cataracts     Diet: Discharge Instruction - Regular Diet Adult  Low Saturated Fat Na <2400 mg    DVT Prophylaxis: Defer to primary service  Mahmood Catheter: Not present  Code Status: Full Code      The patient's care was discussed with the Attending Physician, Dr. Falcon, Bedside Nurse, Patient and Patient's Family.    Chelle WheelerHaley), PA-C  Hospitalist [facility]  [facility]    ______________________________________________________________________    Chief Complaint   Hip pain    History is obtained from the patient and electronic health record    History of Present Illness   Charisse Dumas is a 80 year old female with PMH significant for CAD s/p PCI, HTN, HLD, PVD, anxiety, mild COPD among others who was admitted to [facility] on 6/29/2021 by the orthopedic surgery service for planned left total hip arthroplasty. Surgery with Dr. Dial, no immediate postoperative complications, spinal anesthesia, EBL 150mL. The Hospitalist service was consulted for medical co-management given cardiac history. Preoperative H&P reviewed. Seen by cardiology preoperatively. DAPT held x6 days PTA, ordered to resume per Orthopedics. Continuing beta blocker and ARB per cardiology. No recent CP, SOB. No hx DVT. During my visit, feeling well postoperatively. No N/V, CP, SOB, edema, palpitations, lightheadedness, dizziness.     Review of Systems   The 10 point Review of Systems is negative other than noted in the HPI or here.     Past Medical History    I have reviewed this patient's medical history and updated it with pertinent information if needed.   Past Medical History:   Diagnosis Date      Ascending aortic aneurysm (H)      Carotid artery stenosis      COPD (chronic obstructive pulmonary disease) (H)      Coronary artery disease      Degenerative joint disease      Diverticulitis      Gastroesophageal reflux disease      Generalized anxiety disorder with panic attacks      GI bleed due to NSAIDs      Heart murmur     mitral valve disorder     Hypertension      Migraine with aura      Mixed hyperlipidemia      Recurrent UTI      Spondylolisthesis of lumbosacral region        Past Surgical History   I have reviewed this patient's surgical history and updated it with pertinent information if needed.  Past Surgical History:   Procedure Laterality Date     APPENDECTOMY       BREAST SURGERY       CARDIAC SURGERY  ,     stent placement LUDIVINA to RCA     CARDIAC SURGERY      angiogramc     CHOLECYSTECTOMY       COLONOSCOPY       ENT SURGERY       GENITOURINARY SURGERY       HEAD & NECK SURGERY      closed skull fracture     ORTHOPEDIC SURGERY         Social History   I have reviewed this patient's social history and updated it with pertinent information if needed.  Social History     Tobacco Use     Smoking status: Former Smoker     Types: Cigarettes     Start date:      Quit date:      Years since quittin.5     Smokeless tobacco: Never Used   Substance Use Topics     Alcohol use: None     Comment: occasional     Drug use: None       Family History   I have reviewed this patient's family history and updated it with pertinent information if needed.   Family History   Problem Relation Age of Onset     Heart Disease Mother      Hypertension Mother      Heart Disease Father      Hypertension Father      Heart Disease Sister      Hypertension Sister      Heart Disease Brother      Hypertension Brother      Hypertension Son        Medications   I have reviewed this patient's current medications  Medications Prior to Admission   Medication Sig Dispense Refill Last Dose     albuterol (PROAIR  HFA/PROVENTIL HFA/VENTOLIN HFA) 108 (90 Base) MCG/ACT inhaler Inhale 2 puffs into the lungs 4 times daily as needed for shortness of breath / dyspnea or wheezing    at prn     aluminum chloride (DRYSOL) 20 % external solution Apply topically daily as needed     at prn     aspirin 81 MG EC tablet Take 81 mg by mouth every evening    6/22/2021     atenolol (TENORMIN) 50 MG tablet Take 50 mg by mouth every evening    6/28/2021 at 2000     busPIRone (BUSPAR) 15 MG tablet Take 15 mg by mouth 3 times daily as needed    at prn     butalbital-acetaminophen-caffeine (ESGIC) -40 MG tablet Take 1 tablet by mouth every 4 hours as needed for headaches    at prn     CALCIUM-VITAMIN D PO Take 2 tablets by mouth daily   6/22/2021     clopidogrel (PLAVIX) 75 MG tablet Take 75 mg by mouth daily   6/22/2021     desonide (DESOWEN) 0.05 % external cream Apply topically 2 times daily as needed    at prn     estradiol (ESTRACE) 0.1 MG/GM vaginal cream Place 2 g vaginally twice a week   6/22/2021     famotidine (PEPCID) 40 MG tablet Take 40 mg by mouth every evening     at pm     fish oil-omega-3 fatty acids 1000 MG capsule Take 3 g by mouth daily   6/22/2021     fluticasone-salmeterol (ADVAIR) 250-50 MCG/DOSE inhaler Inhale 1 puff into the lungs every morning     at am     gabapentin (NEURONTIN) 300 MG capsule Take 600 mg by mouth At Bedtime (2 x 300mg)    at hs     LORazepam (ATIVAN) 0.5 MG tablet Take 0.5 mg by mouth nightly as needed     at prn     losartan (COZAAR) 100 MG tablet Take 100 mg by mouth daily   6/29/2021 at 0800     methylcellulose (CITRUCEL) 500 MG TABS tablet Take 1,000 mg by mouth daily   6/22/2021     nitroGLYcerin (NITROSTAT) 0.4 MG sublingual tablet Place 0.4 mg under the tongue every 5 minutes as needed for chest pain For chest pain place 1 tablet under the tongue every 5 minutes for 3 doses. If symptoms persist 5 minutes after 1st dose call 911.    at prn     traMADol (ULTRAM) 50 MG tablet Take 50 mg by  mouth every 6 hours as needed for severe pain    at prn     trimethoprim (TRIMPEX) 100 MG tablet Take 100 mg by mouth daily    at am     Vitamin D3 (CHOLECALCIFEROL) 25 mcg (1000 units) tablet Take by mouth daily   6/22/2021       Allergies   Allergies   Allergen Reactions     Celebrex [Celecoxib] Unknown     Contrast Dye      Hydrochlorothiazide      hyponatremia     Indomethacin      Lidocaine Swelling     Lopid [Gemfibrozil]      paradoxical increase in lipids     Statins [Hmg-Coa-R Inhibitors]      myalgia     Sulfa Drugs Itching     Tachycardia       Tricor [Fenofibrate]      Vioxx [Rofecoxib]      Ciprofloxacin Rash     Folic Acid-Vit B6-Vit B12 Rash     Foltx Rash     Nystatin Rash       Physical Exam   Vital Signs: Temp: 98.2  F (36.8  C) Temp src: Oral BP: 124/47 Pulse: (!) 47   Resp: 16 SpO2: 95 % O2 Device: Nasal cannula Oxygen Delivery: 3 LPM  Weight: 145 lbs 12.8 oz    General: Awake, alert, elderly woman who appears stated age. Looks comfortable sitting up in bed. No acute distress.  HEENT: Normocephalic, atraumatic. Extraocular movements intact.    Respiratory: Clear to auscultation bilaterally, no rales, wheezing, or rhonchi.  Cardiovascular: Regular rate and rhythm, +S1 and S2, no murmur auscultated. No peripheral edema.   Gastrointestinal: Soft, non-tender, non-distended. Bowel sounds present.  Skin: Warm, dry. No obvious rashes or lesions on exposed skin. Dorsalis pedis pulses palpable bilaterally.  Musculoskeletal: No joint swelling, erythema or tenderness. Lt hip surgical area ace wrapped. Wiggles toes bilaterally.  Neurologic: AAO x3. Cranial nerves 2-12 grossly intact, normal strength and sensation.  Psychiatric: Appropriate mood and affect. No obvious anxiety or depression.    Data   Results for orders placed or performed during the hospital encounter of 06/29/21 (from the past 24 hour(s))   Potassium   Result Value Ref Range    Potassium 4.2 3.4 - 5.3 mmol/L   Creatinine   Result Value Ref  Range    Creatinine 0.89 0.52 - 1.04 mg/dL    GFR Estimate 60 (L) >60 mL/min/[1.73_m2]    GFR Estimate If Black 70 >60 mL/min/[1.73_m2]   ABO/Rh type and screen   Result Value Ref Range    ABO O     RH(D) Pos     Antibody Screen Neg     Test Valid Only At [facility]        Specimen Expires 07/02/2021    XR Pelvis w Hip Port Left 1 View    Narrative    XR PELVIS AND HIP PORTABLE LEFT 1 VIEW 6/29/2021 2:00 PM     HISTORY: Postop LESLIE L      Impression    IMPRESSION: Left total hip arthroplasty without apparent complication.  Skin staples and surgical drain are in place.    RANDI MONCADA MD

## 2021-06-29 NOTE — PROGRESS NOTES
Patient vital signs are at baseline: no  Patient able to ambulate as they were prior to admission or with assist devices provided by therapies during their stay:  yes  Patient MUST void prior to discharge:  no  Patient able to tolerate oral intake:  yes  Pain has adequate pain control using Oral analgesics:  Yes    Pt up with PT in halls with walker & belt.  CMS good bilaterally to ft.  Hip drsg dry and intact.  Not voided but bladder scanned for 249cc.  Denies nausea.  Rates pain #2.

## 2021-06-29 NOTE — PROGRESS NOTES
06/29/21 1558   Quick Adds   Type of Visit Initial PT Evaluation   Living Environment   People in home alone   Current Living Arrangements house   Home Accessibility stairs to enter home   Number of Stairs, Main Entrance 2   Stair Railings, Main Entrance none   Number of Stairs, Within Home, Primary other (see comments);7  (multi level; 26 steps total)   Stair Railings, Within Home, Primary other (see comments)  (rail present)   Transportation Anticipated family or friend will provide   Self-Care   Usual Activity Tolerance good   Current Activity Tolerance moderate   Equipment Currently Used at Home cane, straight;walker, rolling   Activity/Exercise/Self-Care Comment normally mod I with mobility; careful when walking secondary to pain/occasional buckling   Disability/Function   Walking or Climbing Stairs Difficulty yes   Walking or Climbing Stairs ambulation difficulty, requires equipment   Fall history within last six months no   Change in Functional Status Since Onset of Current Illness/Injury yes   General Information   Onset of Illness/Injury or Date of Surgery 06/29/21   Referring Physician Gerard Dial MD   Patient/Family Therapy Goals Statement (PT) return home with assist from daughter   Pertinent History of Current Problem (include personal factors and/or comorbidities that impact the POC) per chart: Charisse Dumas is a 80 year old female with PMH significant for CAD s/p PCI, HTN, HLD, PVD, anxiety, mild COPD among others who was admitted to [facility] on 6/29/2021 by the orthopedic surgery service for scheduled left total hip arthroplasty with Dr. Dial.  Seen POD #0; see medical record for further information   Existing Precautions/Restrictions fall;cardiac;no hip IR;no hip ADD past midline;90 degree hip flexion;no pivoting or twisting   Weight-Bearing Status - LLE weight-bearing as tolerated   Heart Disease Risk Factors Medical history   General Observations  low HR; on telemetry   Cognition   Orientation Status (Cognition) oriented x 4   Follows Commands (Cognition) WFL   Cognitive Status Comments appears intact during session   Pain Assessment   Patient Currently in Pain Yes, see Vital Sign flowsheet  (mild L hip pain; doesn't rate)   Integumentary/Edema   Integumentary/Edema Comments L hip incison and drain; covered   Posture    Posture Comments forward flexed at head and trunk   Range of Motion (ROM)   ROM Comment L LE limited by recent surgery and pain; others appear WFL   Strength   Strength Comments L LE limited by recent surgery and pain; others appear WFL   Bed Mobility   Comment (Bed Mobility) min A for supine to sit; precautions   Transfers   Transfer Safety Comments sit>stand with min A and safety cues   Gait/Stairs (Locomotion)   Distance in Feet (Required for LE Total Joints) 100 feet   Comment (Gait/Stairs) Gait in room and hallway with use of a rolling walker and min/CGA; cues for sequencing and safety   Balance   Balance Comments current need for walker and assist   Clinical Impression   Criteria for Skilled Therapeutic Intervention yes, treatment indicated   PT Diagnosis (PT) impaired gait/transfers   Influenced by the following impairments decreased L hip ROM/strength; hip precautions; mild pain   Functional limitations due to impairments impaired independence with functional mobility   Clinical Presentation Evolving/Changing   Clinical Presentation Rationale clinical judgement; level of assist   Clinical Decision Making (Complexity) low complexity   Therapy Frequency (PT) 2x/day   Predicted Duration of Therapy Intervention (days/wks) 2 days   Planned Therapy Interventions (PT) bed mobility training;gait training;ROM (range of motion);stair training;strengthening;transfer training   Anticipated Equipment Needs at Discharge (PT) walker, rolling;cane, straight   Risk & Benefits of therapy have been explained evaluation/treatment results reviewed;care  plan/treatment goals reviewed;risks/benefits reviewed;participants voiced agreement with care plan;current/potential barriers reviewed;participants included;patient;daughter   PT Discharge Planning    PT Rationale for DC Rec Anticipate with ongoing PT patient should be able to return home with 24/7 assist from daughter for mobility and cares initially; plans to go to the cabin and have OP PT there after discharge; has own walker and cane   PT Brief overview of current status  min A for bed mobility; min for transfers; min/CGA for gait   Therapy Certification   Start of care date 06/29/21   Certification date from 06/29/21   Certification date to 07/01/21   Medical Diagnosis L LESLIE   Total Evaluation Time   Total Evaluation Time (Minutes) 10

## 2021-06-29 NOTE — OP NOTE
Procedure Date: 06/29/2021    PREOPERATIVE DIAGNOSIS:  Left hip advanced osteoarthritis.    POSTOPERATIVE DIAGNOSIS:  Left hip advanced osteoarthritis.    PROCEDURE:  Left total hip arthroplasty.    SURGEON:  Gerard Dial MD    ASSISTANT:  Lizzeth Mcdonough, MSN, PA-C.    ANESTHESIA:  Spinal, IV sedation, periarticular block.    IMPLANTS:  [employer].  1.  Size 50 R3 uncemented acetabular component.  2.  A 25 mm acetabular shell screw x2.  3.  +4 offset 0-degree highly cross-linked polyethylene liner for 50 cup and 36 head.  4.  -3, 36 mm cobalt chrome femoral head.  5.  Size 3 high offset Anthology uncemented femoral stem.     PROCEDURE DETAILS:  [place] is brought to the operating room 06/29/2021 for left total hip arthroplasty.  She has significant progressively worsening pain and dysfunction secondary to advanced bone-on-bone osteoarthritis of the left hip despite appropriate previous conservative treatment.  Seen day of surgery in the preoperative holding area and the left hip was marked as the correct operative site.  Received a dose of IV antibiotic within 30 minutes prior to surgical incision.  Post-surgical antibiotic and DVT prophylaxis are indicated and will be prescribed.  Skilled assistant was used for positioning, draping, retraction, closure, dressing application.  The patient was brought to the operating room and placed under a spinal anesthesia with IV sedation.  She was positioned in lateral decubitus with the left hip up.  The left lower extremity was prepped and draped in the standard sterile fashion.  Preoperative timeout was taken.  I made a posterolateral surgical incision over the left hip and dissected down to the deep fascia, which was incised longitudinally.  Posterior hip approach was then carried out including T capsulotomy.  The femoral head was dislocated from the acetabulum and the femoral neck was cut 5 mm proximal to the lesser trochanter.  The femoral head was  removed and deep retractors were used to expose the acetabulum.  Pulvinar, labrum and osteophytes were debrided and removed.  I reamed the acetabulum starting with a 44 extending up to a 51.  I implanted a size 50 R3 uncemented acetabular component into a position of approximately 45 degrees of abduction and 20 degrees of anteversion.  I placed two 25 mm acetabular shell screws into a superior drill holes.  Trial liner was placed.  Femur was then addressed.  Box osteotome, canal finder, and sequential broaching for the Anthology stem used.  I broached up to a 3 with a size 3 broach seated, I trialled and selected a high offset trial modular neck and a -3 modular head.  With this combination of trial implants and with the trial prosthesis reduced, I was pleased with the length, offset and stability.  Trial components were removed.  Jet lavage irrigation performed.  I implanted the +4 offset 0-degree highly cross-linked polyethylene liner for the 50 cup and 36 head.  I implanted the size 3 high offset Anthology uncemented stem.  I implanted a -3, 36 mm cobalt chrome head onto the dried Dan taper.  Prosthesis was reduced.  Length and stability confirmed to be excellent.  Betadine wash performed.  Jet lavage irrigation performed.  Posterior capsule and piriformis tendon repaired.  Deep fascia was closed over a medium Hemovac drain.  Superficial soft tissues irrigated and closed in layers.  Sterile dressing was applied.  Hip abduction pillow was applied.  Patient was brought to recovery in stable condition.  Needle and lap counts were correct at the end of the case.  There were no known complications.    Gerard Dial MD        D: 2021   T: 2021   MT: [id number]    Name:     JAVIER DUKES  MRN:      [id number]        Account:        [id number]   :      1940           Procedure Date: 2021     Document: [id number]

## 2021-06-29 NOTE — OR NURSING
OK by GHAZAL Bernabe to transfer to the floor. Sent with 1 belonging bag and cane. Daughter Viviana called with room number and update.

## 2021-06-29 NOTE — BRIEF OP NOTE
[facility]    Brief Operative Note    Pre-operative diagnosis: Osteoarthritis of left hip [M16.12]  Post-operative diagnosis Same as pre-operative diagnosis    Procedure: Procedure(s):  LEFT TOTAL HIP ARTHROPLASTY  Surgeon: Surgeon(s) and Role:     * Gerard Dial MD - Primary     * Lizzeth Mcdonough PA-C - Assisting  Anesthesia: Spinal   Estimated blood loss: 150  Drains: Hemovac  Specimens:   ID Type Source Tests Collected by Time Destination   1 : LEFT HIP BONE AND TISSUE Other (specify in comments) Other OR DOCUMENTATION ONLY Gerard Dial MD 6/29/2021 12:08 PM      Findings:   None.  Complications: None.  Implants:   Implant Name Type Inv. Item Serial No.  Lot No. LRB No. Used Action   IMP SCR ACET SNN SPHERICAL HEAD 6.5X25MM [id number] - [id number] Metallic Hardware/[place] IMP SCR ACET SNN SPHERICAL HEAD 6.5X25MM [id number]  [employer] [id number] Left 1 Implanted   IMP SHELL SNR ACET R3 3H 50MM [id number] - [id number] Total Joint Component/Insert IMP SHELL SNR ACET R3 3H 50MM [id number]  [place]  [id number] Left 1 Implanted   IMP SCR ACET SNN SPHERICAL HEAD 6.5X25MM [id number] - [id number] Metallic Hardware/[place] IMP SCR ACET SNN SPHERICAL HEAD 6.5X25MM [id number]  [employer] [id number] Left 1 Implanted   IMP STEM SNN HIGH OFFSET SZ 3 [id number] - [id number] Total Joint Component/Insert IMP STEM SNN HIGH OFFSET SZ 3 [id number]  [place]  [id number] Left 1 Implanted   ACETABULAR LINER, 36 MM 1.D. 50 MM O.D. LATERALIZED +4 MM    [employer] [id number] Left 1 Implanted   IMP HEAD FEMORAL SNR [place] 36MM -3 [id number] - [id number] Total Joint Component/Insert IMP HEAD FEMORAL SNR [place] 36MM -3 [id number]  [place]  [id number] Left 1 Implanted

## 2021-06-29 NOTE — PROGRESS NOTES
[facility]      OUTPATIENT PHYSICAL THERAPY EVALUATION  PLAN OF TREATMENT FOR OUTPATIENT REHABILITATION  (COMPLETE FOR INITIAL CLAIMS ONLY)  Patient's Last Name, First Name, M.I.  YOB: 1940  Charisse Dumas                        Provider's Name  [facility] Medical Record No.  [id number]                               Onset Date:  06/29/21   Start of Care Date:  06/29/21      Type:     _X_PT   ___OT   ___SLP Medical Diagnosis:  L LESLIE                        PT Diagnosis:  impaired gait/transfers   Visits from SOC:  1   _________________________________________________________________________________  Plan of Treatment/Functional Goals    Planned Interventions: bed mobility training, gait training, ROM (range of motion), stair training, strengthening, transfer training     Goals: See Physical Therapy Goals on Care Plan in [employer] electronic health record.    Therapy Frequency: 2x/day  Predicted Duration of Therapy Intervention: 2 days  _________________________________________________________________________________    I CERTIFY THE NEED FOR THESE SERVICES FURNISHED UNDER        THIS PLAN OF TREATMENT AND WHILE UNDER MY CARE     (Physician co-signature of this document indicates review and certification of the therapy plan).                Certification date from: 06/29/21, Certification date to: 07/01/21    Referring Physician: Gerard Dial MD            Initial Assessment        See Physical Therapy evaluation dated 06/29/21 in Epic electronic health record.

## 2021-06-30 ENCOUNTER — APPOINTMENT (OUTPATIENT)
Dept: PHYSICAL THERAPY | Facility: CLINIC | Age: 81
End: 2021-06-30
Attending: ORTHOPAEDIC SURGERY
Payer: COMMERCIAL

## 2021-06-30 ENCOUNTER — APPOINTMENT (OUTPATIENT)
Dept: OCCUPATIONAL THERAPY | Facility: CLINIC | Age: 81
End: 2021-06-30
Attending: ORTHOPAEDIC SURGERY
Payer: COMMERCIAL

## 2021-06-30 VITALS
TEMPERATURE: 99.3 F | BODY MASS INDEX: 28.62 KG/M2 | WEIGHT: 145.8 LBS | HEART RATE: 54 BPM | OXYGEN SATURATION: 93 % | SYSTOLIC BLOOD PRESSURE: 102 MMHG | RESPIRATION RATE: 16 BRPM | DIASTOLIC BLOOD PRESSURE: 53 MMHG | HEIGHT: 60 IN

## 2021-06-30 LAB
ANION GAP SERPL CALCULATED.3IONS-SCNC: 4 MMOL/L (ref 3–14)
BUN SERPL-MCNC: 18 MG/DL (ref 7–30)
CALCIUM SERPL-MCNC: 8.8 MG/DL (ref 8.5–10.1)
CHLORIDE SERPL-SCNC: 106 MMOL/L (ref 94–109)
CO2 SERPL-SCNC: 25 MMOL/L (ref 20–32)
CREAT SERPL-MCNC: 0.84 MG/DL (ref 0.52–1.04)
GFR SERPL CREATININE-BSD FRML MDRD: 65 ML/MIN/{1.73_M2}
GLUCOSE SERPL-MCNC: 132 MG/DL (ref 70–99)
HGB BLD-MCNC: 12.3 G/DL (ref 11.7–15.7)
POTASSIUM SERPL-SCNC: 4.6 MMOL/L (ref 3.4–5.3)
SODIUM SERPL-SCNC: 135 MMOL/L (ref 133–144)

## 2021-06-30 PROCEDURE — 99225 PR SUBSEQUENT OBSERVATION CARE,LEVEL II: CPT | Performed by: HOSPITALIST

## 2021-06-30 PROCEDURE — 80048 BASIC METABOLIC PNL TOTAL CA: CPT | Performed by: ORTHOPAEDIC SURGERY

## 2021-06-30 PROCEDURE — 85018 HEMOGLOBIN: CPT | Performed by: ORTHOPAEDIC SURGERY

## 2021-06-30 PROCEDURE — 250N000013 HC RX MED GY IP 250 OP 250 PS 637: Performed by: ORTHOPAEDIC SURGERY

## 2021-06-30 PROCEDURE — 97530 THERAPEUTIC ACTIVITIES: CPT | Mod: GP | Performed by: PHYSICAL THERAPIST

## 2021-06-30 PROCEDURE — 97110 THERAPEUTIC EXERCISES: CPT | Mod: GP | Performed by: PHYSICAL THERAPIST

## 2021-06-30 PROCEDURE — 250N000011 HC RX IP 250 OP 636: Performed by: ORTHOPAEDIC SURGERY

## 2021-06-30 PROCEDURE — 97116 GAIT TRAINING THERAPY: CPT | Mod: GP | Performed by: PHYSICAL THERAPIST

## 2021-06-30 PROCEDURE — 36415 COLL VENOUS BLD VENIPUNCTURE: CPT | Performed by: ORTHOPAEDIC SURGERY

## 2021-06-30 PROCEDURE — 99207 PR CDG-CODE CATEGORY CHANGED: CPT | Performed by: HOSPITALIST

## 2021-06-30 PROCEDURE — 250N000013 HC RX MED GY IP 250 OP 250 PS 637: Performed by: PHYSICIAN ASSISTANT

## 2021-06-30 PROCEDURE — 97535 SELF CARE MNGMENT TRAINING: CPT | Mod: GO

## 2021-06-30 PROCEDURE — 97165 OT EVAL LOW COMPLEX 30 MIN: CPT | Mod: GO

## 2021-06-30 RX ORDER — ASPIRIN 81 MG/1
81 TABLET ORAL DAILY
Status: DISCONTINUED | OUTPATIENT
Start: 2021-06-30 | End: 2021-06-30 | Stop reason: HOSPADM

## 2021-06-30 RX ORDER — LOSARTAN POTASSIUM 100 MG/1
100 TABLET ORAL DAILY
Start: 2021-07-04

## 2021-06-30 RX ORDER — HYDROMORPHONE HYDROCHLORIDE 2 MG/1
2 TABLET ORAL EVERY 4 HOURS PRN
Qty: 40 TABLET | Refills: 0 | Status: SHIPPED | OUTPATIENT
Start: 2021-06-30 | End: 2024-03-11

## 2021-06-30 RX ADMIN — HYDROMORPHONE HYDROCHLORIDE 4 MG: 4 TABLET ORAL at 08:11

## 2021-06-30 RX ADMIN — CLOPIDOGREL BISULFATE 75 MG: 75 TABLET ORAL at 08:11

## 2021-06-30 RX ADMIN — HYDROXYZINE HYDROCHLORIDE 10 MG: 10 TABLET ORAL at 03:24

## 2021-06-30 RX ADMIN — CEFAZOLIN 1 G: 1 INJECTION, POWDER, FOR SOLUTION INTRAMUSCULAR; INTRAVENOUS at 04:45

## 2021-06-30 RX ADMIN — HYDROMORPHONE HYDROCHLORIDE 4 MG: 4 TABLET ORAL at 11:52

## 2021-06-30 RX ADMIN — DOCUSATE SODIUM 100 MG: 100 CAPSULE, LIQUID FILLED ORAL at 08:11

## 2021-06-30 RX ADMIN — TRIMETHOPRIM 100 MG: 100 TABLET ORAL at 08:11

## 2021-06-30 RX ADMIN — ASPIRIN 81 MG: 81 TABLET, COATED ORAL at 08:10

## 2021-06-30 RX ADMIN — SENNOSIDES AND DOCUSATE SODIUM 1 TABLET: 8.6; 5 TABLET ORAL at 08:10

## 2021-06-30 RX ADMIN — HYDROMORPHONE HYDROCHLORIDE 4 MG: 4 TABLET ORAL at 01:48

## 2021-06-30 RX ADMIN — BUSPIRONE HYDROCHLORIDE 15 MG: 15 TABLET ORAL at 08:11

## 2021-06-30 RX ADMIN — ACETAMINOPHEN 975 MG: 325 TABLET, FILM COATED ORAL at 03:24

## 2021-06-30 RX ADMIN — ACETAMINOPHEN 975 MG: 325 TABLET, FILM COATED ORAL at 11:52

## 2021-06-30 RX ADMIN — LOSARTAN POTASSIUM 100 MG: 100 TABLET, FILM COATED ORAL at 08:11

## 2021-06-30 ASSESSMENT — ACTIVITIES OF DAILY LIVING (ADL): PREVIOUS_RESPONSIBILITIES: MEAL PREP;HOUSEKEEPING;LAUNDRY;SHOPPING;MEDICATION MANAGEMENT;FINANCES;DRIVING

## 2021-06-30 NOTE — PLAN OF CARE
Patient vital signs are at baseline: Yes  Patient able to ambulate as they were prior to admission or with assist devices provided by therapies during their stay:  Yes  Patient MUST void prior to discharge:  Yes  Patient able to tolerate oral intake:  Yes  Pain has adequate pain control using Oral analgesics:  Yes     Pt A&Ox4. Up with 1 assist using gait belt and walker to bathroom; voiding adequately.  CMS intact, dressing CDI, cms intact with hemovac in place. Pain managed with PRN and scheduled pain meds. Continue to monitor.

## 2021-06-30 NOTE — PROGRESS NOTES
[facility]    Hospitalist Progress Note      Assessment & Plan   Charisse Dumas is a 80 year old female with PMH significant for CAD s/p PCI, HTN, HLD, PVD, anxiety, mild COPD among others who was admitted to [facility] on 6/29/2021 by the orthopedic surgery service for scheduled left total hip arthroplasty with Dr. Dial.     Osteoarthritis s/p left total hip arthroplasty 6/29  Acute blood loss   Surgery with Dr. Dial. Properative Hgb 14.4. Holding DAPT x6 days PTA. Post op hemoglobin 12.3  - Routine postoperative cares per primary service, including IVF, pain control, abx, DVT ppx, and disposition  - resume PTA aspirin and plavix     Coronary artery disease s/p LUDIVINA to RCA 2005, 2013  Essential hypertension  Mixed hyperlipidemia  PTA regimen: losartan 100mg/d, PRN nitroglycerin, atenolol 50mg Q HS  DAPT (ASA, clopidogrel) held x6 days PTA. No recent anginal symptoms.   Hx RCA in-stent restenosis 2013.   Last ECHO 5/2021: normal LV size and function, EF 60%, aortic sclerosis, no stenosis, mild aortic insufficiency, trace MR, ascending aorta 4.6cm.  - Baseline SBPs 110s-120s  - Failed multiple medications for hyperlipidemia, cardiology following and aware of very elevated lipid panel  - Cardiac diet  - lower BPs after surgery, did receive her losartan in AM and SBP near 100. Denied dizziness  - Plan to continue atenolol as she is  - Losartan is to be cut in half for next 3 days, held completely if SBP<100.   - Then resume PTA losartan 100mg on July 4th.     Anxiety disorder with panic attacks: Continue PTA buspar 15mg TID and lorazepam 0.5mg nightly PRN     Mild COPD: Continue PTA PRN albuterol     Chronic stable diagnoses: Appropriate PTA medications will be resumed.  Hx of heavy tobacco use - 45 pack years  Peripheral vascular disease  Migraines  Seasonal allergies  Insomnia  GERD  Carotid artery stenosis  Ascending aortic aneurysm  History of GI  bleed due to NSAIDs  Diverticulitis  Recurrent UTI  Cataracts     DVT Prophylaxis: DAPT  Code Status: Full Code  Expected discharge: Today, recommended to prior living arrangement per primary provider    Cristel Falcon DO  Text Page (7am - 6pm)    Interval History   Patient seen and examined. No chest pain, shortness of breath, nausea, vomiting, fevers, chills, dizziness, lightheadedness. Discussed blood pressures at length with daughter at bedside. They have a plan for getting to cabin tomorrow where there is a blood pressure cuff. Pain controlled. She had a large BM on 6/29 evening. Stable for discharge from IM standpoint    -Data reviewed today: I reviewed all new labs and imaging results over the last 24 hours. I personally reviewed no images or EKG's today.    Physical Exam   Temp: 99.3  F (37.4  C) Temp src: Oral BP: 102/53 Pulse: 54   Resp: 16 SpO2: 93 % O2 Device: Nasal cannula Oxygen Delivery: 2 LPM  Vitals:    06/29/21 0954   Weight: 66.1 kg (145 lb 12.8 oz)     Vital Signs with Ranges  Temp:  [97.1  F (36.2  C)-99.3  F (37.4  C)] 99.3  F (37.4  C)  Pulse:  [47-60] 54  Resp:  [8-19] 16  BP: ()/(35-88) 102/53  SpO2:  [93 %-99 %] 93 %  I/O last 3 completed shifts:  In: 1700 [P.O.:600; I.V.:1100]  Out: 850 [Urine:700; Blood:150]    Constitutional: Awake, alert, cooperative, no apparent distress  Respiratory: Clear to auscultation bilaterally, no crackles or wheezing  Cardiovascular: Regular rate and rhythm, normal S1 and S2, and no murmur noted  GI: Normal bowel sounds, soft, non-distended, non-tender  Skin/Integumen: No rashes, no cyanosis, trace bilateral lower extremity edema  Other: Left hip dressed, hemovac with minimal drainage intact.    Medications     lactated ringers Stopped (06/30/21 0723)       acetaminophen  975 mg Oral Q8H     aspirin  81 mg Oral Daily     atenolol  50 mg Oral QPM     busPIRone  15 mg Oral TID     clopidogrel  75 mg Oral Daily     docusate sodium  100 mg Oral BID      famotidine  40 mg Oral QPM     fluticasone-vilanterol  1 puff Inhalation Daily     gabapentin  600 mg Oral At Bedtime     losartan  100 mg Oral Daily     polyethylene glycol  17 g Oral Daily     senna-docusate  1 tablet Oral BID     sodium chloride (PF)  3 mL Intracatheter Q8H     trimethoprim  100 mg Oral Daily       Data   Recent Labs   Lab 06/30/21  0817 06/29/21  0949   HGB 12.3  --      --    POTASSIUM 4.6 4.2   CHLORIDE 106  --    CO2 25  --    BUN 18  --    CR 0.84 0.89   ANIONGAP 4  --    TIA 8.8  --    *  --        Recent Results (from the past 24 hour(s))   XR Pelvis w Hip Port Left 1 View    Narrative    XR PELVIS AND HIP PORTABLE LEFT 1 VIEW 6/29/2021 2:00 PM     HISTORY: Postop LESLIE L      Impression    IMPRESSION: Left total hip arthroplasty without apparent complication.  Skin staples and surgical drain are in place.    RANDI MONCADA MD

## 2021-06-30 NOTE — PROGRESS NOTES
Occupational Therapy Discharge Summary    Reason for therapy discharge:    All goals and outcomes met, no further needs identified.    Progress towards therapy goal(s). See goals on Care Plan in [facility] electronic health record for goal details.  Goals met    Therapy recommendation(s):    Anticipate pt will require SBA for LB dressing, toileting, grooming; Samy with bathing; and assist with strenuous IADLs. Pt reports her support system can provide this assist.

## 2021-06-30 NOTE — PROGRESS NOTES
[facility]      OUTPATIENT OCCUPATIONAL THERAPY  EVALUATION  PLAN OF TREATMENT FOR OUTPATIENT REHABILITATION  (COMPLETE FOR INITIAL CLAIMS ONLY)  Patient's Last Name, First Name, M.I.  YOB: 1940  Charisse Dumas                          Provider's Name  [facility] Medical Record No.  [id number]                               Onset Date:  06/29/21   Start of Care Date:        Type:     ___PT   _X_OT   ___SLP Medical Diagnosis:                           OT Diagnosis:  decline function   Visits from SOC:  1   _________________________________________________________________________________  Plan of Treatment/Functional Goals    Planned Interventions: ADL retraining, home program guidelines, progressive activity/exercise, risk factor education   Goals: See Occupational Therapy Goals on Care Plan in [facility] electronic health record.    Therapy Frequency: 1x eval and treat  Predicted Duration of Therapy Intervention:    _________________________________________________________________________________    I CERTIFY THE NEED FOR THESE SERVICES FURNISHED UNDER        THIS PLAN OF TREATMENT AND WHILE UNDER MY CARE     (Physician co-signature of this document indicates review and certification of the therapy plan).                 ,      Referring Physician: Gerard Dial MD            Initial Assessment        See Occupational Therapy evaluation dated   in Epic electronic health record.

## 2021-06-30 NOTE — PROGRESS NOTES
Patient vital signs are at baseline: Yes  Patient able to ambulate as they were prior to admission or with assist devices provided by therapies during their stay:  Yes  Patient MUST void prior to discharge:  Yes  Patient able to tolerate oral intake:  Yes  Pain has adequate pain control using Oral analgesics:  Yes    On pathway- pain under control. Up in chair.Mobility improving.

## 2021-06-30 NOTE — CONSULTS
SW: SW reviewed chart and there are no current SW needs. [facility] dtr and OPPT. No consult needed.     COLEMAN Ahuja

## 2021-06-30 NOTE — PROGRESS NOTES
06/30/21 0923   Quick Adds   Type of Visit Initial Occupational Therapy Evaluation   Living Environment   People in home alone   Current Living Arrangements house   Home Accessibility stairs to enter home   Transportation Anticipated family or friend will provide   Living Environment Comments Plans to discharge to [facility] with 24 hr assist from daughter   Self-Care   Usual Activity Tolerance good   Current Activity Tolerance moderate   Equipment Currently Used at Home cane, straight;walker, rolling   Activity/Exercise/Self-Care Comment At baseline is independent in self-cares. At [facility] will have step in shower with built in seat. Will have handicap height toilets with vanity next to it. reacher. shower mat   Disability/Function   Fall history within last six months no   Change in Functional Status Since Onset of Current Illness/Injury yes   General Information   Onset of Illness/Injury or Date of Surgery 06/29/21   Referring Physician Gerard Dial MD   Patient/Family Therapy Goal Statement (OT) Return home   Additional Occupational Profile Info/Pertinent History of Current Problem per chart: Charisse Dumas is a 80 year old female with PMH significant for CAD s/p PCI, HTN, HLD, PVD, anxiety, mild COPD among others who was admitted to [facility] on 6/29/2021 by the orthopedic surgery service for scheduled left total hip arthroplasty with Dr. Dial.   Existing Precautions/Restrictions fall;no hip IR;no hip ADD past midline;90 degree hip flexion;no pivoting or twisting   Cognitive Status Examination   Orientation Status orientation to person, place and time   Affect/Mental Status (Cognitive) WFL   Follows Commands WFL   Visual Perception   Visual Impairment/Limitations WFL;corrective lenses for reading   Sensory   Sensory Comments Baseline sensation impairment in RUE   Pain Assessment   Patient Currently in Pain Yes, see Vital Sign flowsheet   Transfers   Transfers  sit-stand transfer;toilet transfer   Sit-Stand Transfer   Sit-Stand [place] (Transfers) supervision   Toilet Transfer   Type (Toilet Transfer) sit-stand;stand-sit   [place] Level (Toilet Transfer) supervision   Activities of Daily Living   BADL Assessment/Intervention lower body dressing;grooming;bathing   Bathing Assessment/Intervention   [place] Level (Bathing) moderate assist (50% patient effort)   Lower Body Dressing Assessment/Training   [place] Level (Lower Body Dressing) maximum assist (25% patient effort)   Grooming Assessment/Training   [place] Level (Grooming) supervision   Instrumental Activities of Daily Living (IADL)   Previous Responsibilities meal prep;housekeeping;laundry;shopping;medication management;finances;driving   Clinical Impression   Criteria for Skilled Therapeutic Interventions Met (OT) yes;meets criteria;skilled treatment is necessary   OT Diagnosis decline function   OT Problem List-Impairments impacting ADL activity tolerance impaired;balance;mobility;pain;post-surgical precautions   Assessment of Occupational Performance 5 or more Performance Deficits   Identified Performance Deficits LB dressing, toileting, bathing, functional mobility, IADLs   Planned Therapy Interventions (OT) ADL retraining;home program guidelines;progressive activity/exercise;risk factor education   Clinical Decision Making Complexity (OT) low complexity   Therapy Frequency (OT) 1x eval and treat   Anticipated Equipment Needs Upon Discharge (OT) shower chair  (toilet safety frame. soap on rope. Hip kit)   Risk & Benefits of therapy have been explained evaluation/treatment results reviewed;care plan/treatment goals reviewed;risks/benefits reviewed;current/potential barriers reviewed;participants voiced agreement with care plan;participants included;patient;spouse/significant other;son   OT Discharge Planning    OT Rationale for DC Rec Anticipate pt will require SBA for LB dressing,  toileting, grooming; Samy with bathing; and assist with strenuous IADLs. Pt reports her support system can provide this assist.   Total Evaluation Time (Minutes)   Total Evaluation Time (Minutes) 7

## 2021-06-30 NOTE — PROGRESS NOTES
POD#1 L LESLIE    Spasm and pain left foot during the night  Hip pain manageable  No CP, SOB, N, V  AVSS  CMS intact  Dressing CDI  Limb lengths equal  Foot without swelling. Mild tenderness over dorsum  Calf and thigh soft and nontender  PT/OT/DC planning  Home once pain control adequate and passes PT  Follow-up with me 2 weeks  WBAT, Posterior hip precautions  Aspirin, plavix may be resumed    Gerard Dial MD

## 2021-06-30 NOTE — PLAN OF CARE
Physical Therapy Discharge Summary    Reason for therapy discharge:    Discharged to home with outpatient therapy.    Progress towards therapy goal(s). See goals on Care Plan in [facility] electronic health record for goal details.  Goals partially met.  Barriers to achieving goals:   discharge from facility.    Therapy recommendation(s):    Continue home exercise program.  Assist from daughter; patient states plans for OP PT at discharge to maximize return to PLOF

## 2021-07-01 NOTE — DISCHARGE SUMMARY
Discharge Summary    Charisse Dumas MRN# [id number]   YOB: 1940 Age: 80 year old     Date of Admission:  6/29/2021  Date of Discharge:  6/30/21  Admitting Physician:  Gerard Dial MD  Discharge Physician:  Gerard Dial MD     Primary Provider: No Ref-Primary, [id number]          Admission Diagnoses:   Osteoarthritis left hip          Discharge Diagnosis:   Same           Surgical Procedure:   Total Hip arthroplasty           Discharge Disposition:   Discharged to home with home PT           Medications Prior to Admission:     No medications prior to admission.             Discharge Medications:     Discharge Medication List as of 6/30/2021 11:38 AM      START taking these medications    Details   acetaminophen (TYLENOL) 325 MG tablet Take 2 tablets (650 mg) by mouth every 4 hours as needed for other (mild pain), Disp-100 tablet, R-0, E-Prescribe      hydrOXYzine (ATARAX) 10 MG tablet Take 1 tablet (10 mg) by mouth every 6 hours as needed for itching or anxiety (with pain, moderate pain), Disp-30 tablet, R-0, E-Prescribe      senna-docusate (SENOKOT-S/PERICOLACE) 8.6-50 MG tablet Take 1-2 tablets by mouth 2 times daily Take while on oral narcotics to prevent or treat constipation., Disp-30 tablet, R-0, E-PrescribeWhile taking narcotics         CONTINUE these medications which have CHANGED    Details   aspirin 81 MG EC tablet Take 1 tablet (81 mg) by mouth 2 times daily, Disp-60 tablet, R-0, E-Prescribe      HYDROmorphone (DILAUDID) 2 MG tablet Take 1 tablet (2 mg) by mouth every 4 hours as needed for moderate to severe pain, Disp-40 tablet, R-0, Local Print      losartan (COZAAR) 100 MG tablet Take 1 tablet (100 mg) by mouth daily, No Print Out         CONTINUE these medications which have NOT CHANGED    Details   albuterol (PROAIR HFA/PROVENTIL HFA/VENTOLIN HFA) 108 (90 Base) MCG/ACT inhaler Inhale 2 puffs into the lungs 4 times daily as needed for shortness of breath / dyspnea or  wheezing, HistoricalPharmacy may dispense brand covered by insurance (Proair, or proventil or ventolin or generic albuterol inhaler)      aluminum chloride (DRYSOL) 20 % external solution Apply topically daily as needed Historical      atenolol (TENORMIN) 50 MG tablet Take 50 mg by mouth every evening , Historical      busPIRone (BUSPAR) 15 MG tablet Take 15 mg by mouth 3 times daily as needed, Historical      butalbital-acetaminophen-caffeine (ESGIC) -40 MG tablet Take 1 tablet by mouth every 4 hours as needed for headaches, Historical      CALCIUM-VITAMIN D PO Take 2 tablets by mouth daily, Historical      clopidogrel (PLAVIX) 75 MG tablet Take 75 mg by mouth daily, Historical      desonide (DESOWEN) 0.05 % external cream Apply topically 2 times daily as neededHistorical      estradiol (ESTRACE) 0.1 MG/GM vaginal cream Place 2 g vaginally twice a weekHistorical      famotidine (PEPCID) 40 MG tablet Take 40 mg by mouth every evening , Historical      fish oil-omega-3 fatty acids 1000 MG capsule Take 3 g by mouth daily, Historical      fluticasone-salmeterol (ADVAIR) 250-50 MCG/DOSE inhaler Inhale 1 puff into the lungs every morning , Historical      gabapentin (NEURONTIN) 300 MG capsule Take 600 mg by mouth At Bedtime (2 x 300mg), Historical      LORazepam (ATIVAN) 0.5 MG tablet Take 0.5 mg by mouth nightly as needed , Historical      methylcellulose (CITRUCEL) 500 MG TABS tablet Take 1,000 mg by mouth daily, Historical      nitroGLYcerin (NITROSTAT) 0.4 MG sublingual tablet Place 0.4 mg under the tongue every 5 minutes as needed for chest pain For chest pain place 1 tablet under the tongue every 5 minutes for 3 doses. If symptoms persist 5 minutes after 1st dose call 911., Historical      trimethoprim (TRIMPEX) 100 MG tablet Take 100 mg by mouth daily, Historical      Vitamin D3 (CHOLECALCIFEROL) 25 mcg (1000 units) tablet Take by mouth daily, Historical         STOP taking these medications       traMADol  (ULTRAM) 50 MG tablet Comments:   Reason for Stopping:                     Consultations:   No consultations were requested during this admission           Hospital Course:   The patient was admitted after the surgical procedure.The patient underwent an uneventful Total hip arthroplasty. Postoperatively, anticoagulation with low dose aspirin and her pre operative Plavix was initiated. Home medications have been reconciled. Dilaudid 2 mg was prescribed for pain.             Discharge Instructions and Follow-Up:        Discharge activity: WBAT with a walker   Discharge follow-up: Follow-up with Lizzeth Mcdonough PA-C in ~14 days post-op. [phone]   Outpatient therapy: Should already be arranged by office.  If not, patient should call to schedule 2x/week x 3 weeks.   Home Care agency: None   Supplies and equipment: None        Wound care: Daily dry dressing changes.  May use adaptic/xeroform directly over incision if available.  Staples out 12 days post-op and apply steri-strips.    Other instructions: Posterior hip precautions.  No hip flexion up past 90deg.  No ADduction of the surgical leg across the midline.     Lizzeth Mcdonough PA-C

## 2021-07-29 ENCOUNTER — DOCUMENTATION ONLY (OUTPATIENT)
Dept: OTHER | Facility: CLINIC | Age: 81
End: 2021-07-29

## 2021-08-15 ENCOUNTER — HEALTH MAINTENANCE LETTER (OUTPATIENT)
Age: 81
End: 2021-08-15

## 2021-10-11 ENCOUNTER — HEALTH MAINTENANCE LETTER (OUTPATIENT)
Age: 81
End: 2021-10-11

## 2022-09-25 ENCOUNTER — HEALTH MAINTENANCE LETTER (OUTPATIENT)
Age: 82
End: 2022-09-25

## 2023-10-14 ENCOUNTER — HEALTH MAINTENANCE LETTER (OUTPATIENT)
Age: 83
End: 2023-10-14

## 2024-02-12 ENCOUNTER — DOCUMENTATION ONLY (OUTPATIENT)
Dept: OTHER | Facility: CLINIC | Age: 84
End: 2024-02-12
Payer: COMMERCIAL

## 2024-03-07 NOTE — PROGRESS NOTES
Pre-op Total Joint Patient Screening    Do you have a ride available to come to the hospital the day after your surgery by 8am with anticipated discharge of 11am? Y  What is the name of this person? Livier(daughter)  Do you have a  set up after surgery? Y  Will your  be the same person that gives you a ride home after surgery? Y  Have you received the Joint Replacement Guidebook? Y  Do you have any questions about your guidebook? N  Have you signed up for Epic Care Companion? Y  Have you signed up for MY Chart access? Y

## 2024-03-11 ENCOUNTER — ANESTHESIA EVENT (OUTPATIENT)
Dept: SURGERY | Facility: CLINIC | Age: 84
End: 2024-03-11
Payer: COMMERCIAL

## 2024-03-11 PROBLEM — F41.9 ANXIETY: Status: ACTIVE | Noted: 2024-03-11

## 2024-03-11 RX ORDER — MAGNESIUM GLYCINATE 100 MG
100 CAPSULE ORAL DAILY
COMMUNITY

## 2024-03-11 ASSESSMENT — COPD QUESTIONNAIRES
COPD: 1
CAT_SEVERITY: MILD

## 2024-03-11 NOTE — PROGRESS NOTES
PTA medications updated by Medication Scribe prior to surgery via phone call with patient (last doses completed by Nurse)     Medication history sources: Patient and H&P  In the past week, patient estimated taking medication this percent of the time: Greater than 90%      Significant changes made to the medication list:  None      Additional medication history information:   Patient was advised to bring: ADVAIR INH    Medication reconciliation completed by provider prior to medication history? No    Time spent in this activity: 35 MINUTES    The information provided in this note is only as accurate as the sources available at the time of update(s)      Prior to Admission medications    Medication Sig Last Dose Taking? Auth Provider Long Term End Date   acetaminophen (TYLENOL) 325 MG tablet Take 2 tablets (650 mg) by mouth every 4 hours as needed for other (mild pain)  at PRN Yes Gerard Dial MD     albuterol (PROAIR HFA/PROVENTIL HFA/VENTOLIN HFA) 108 (90 Base) MCG/ACT inhaler Inhale 2 puffs into the lungs 4 times daily as needed for shortness of breath / dyspnea or wheezing  at PRN Yes Reported, Patient Yes    aluminum chloride (DRYSOL) 20 % external solution Apply topically daily as needed (SWEATING)  at PRN Yes Reported, Patient     aspirin 81 MG EC tablet Take 1 tablet (81 mg) by mouth 2 times daily  Patient taking differently: Take 81 mg by mouth daily  Yes Gerard Dial MD     atenolol (TENORMIN) 50 MG tablet Take 50 mg by mouth every evening   at PM Yes Reported, Patient Yes    busPIRone (BUSPAR) 15 MG tablet Take 15 mg by mouth 3 times daily as needed  at PM Yes Reported, Patient Yes    butalbital-acetaminophen-caffeine (ESGIC) -40 MG tablet Take 1 tablet by mouth every 4 hours as needed for headaches  at PRN Yes Reported, Patient     estradiol (ESTRACE) 0.1 MG/GM vaginal cream Place 2 g vaginally twice a week (M/F)  at PM Yes Reported, Patient     famotidine (PEPCID) 40 MG tablet Take 40 mg  by mouth every evening  at PM Yes Reported, Patient     fluticasone-salmeterol (ADVAIR) 250-50 MCG/DOSE inhaler Inhale 1 puff into the lungs every morning   at AM Yes Reported, Patient Yes    losartan (COZAAR) 100 MG tablet Take 1 tablet (100 mg) by mouth daily  at AM Yes Cristel Falcon, DO Yes    magnesium glycinate 100 MG CAPS capsule Take 100 mg by mouth daily  at PM Yes Reported, Patient     nitroGLYcerin (NITROSTAT) 0.4 MG sublingual tablet Place 0.4 mg under the tongue every 5 minutes as needed for chest pain For chest pain place 1 tablet under the tongue every 5 minutes for 3 doses. If symptoms persist 5 minutes after 1st dose call 911.  Yes Reported, Patient Yes    trimethoprim (TRIMPEX) 100 MG tablet Take 100 mg by mouth daily  at AM Yes Reported, Patient     Vitamin D3 (CHOLECALCIFEROL) 25 mcg (1000 units) tablet Take by mouth daily  at AM Yes Reported, Patient         Medication history completed by: Quiana Johnston

## 2024-03-11 NOTE — ANESTHESIA PREPROCEDURE EVALUATION
Anesthesia Pre-Procedure Evaluation    Patient: Charisse Dumas   MRN: [id number] : 1940        Procedure : Procedure(s):  RIGHT TOTAL HIP ARTHROPLASTY          Past Medical History:   Diagnosis Date     Ascending aortic aneurysm (H24)      Carotid artery stenosis      COPD (chronic obstructive pulmonary disease) (H)      Coronary artery disease      Degenerative joint disease      Diverticulitis      Gastroesophageal reflux disease      Generalized anxiety disorder with panic attacks      GI bleed due to NSAIDs      Heart murmur     mitral valve disorder     Hypertension      Insomnia      Migraine with aura      Mixed hyperlipidemia      Peripheral vascular disease (H24)      Pulmonary nodule      Recurrent UTI      Spondylolisthesis of lumbosacral region      Stented coronary artery      Ventral hernia without obstruction or gangrene       Past Surgical History:   Procedure Laterality Date     APPENDECTOMY       ARTHROPLASTY HIP Left 2021    Procedure: LEFT TOTAL HIP ARTHROPLASTY;  Surgeon: Gerard Dial MD;  Location: [facility] OR     BREAST SURGERY      reduction     CARDIAC SURGERY  ,     stent placement LUDIVINA to RCA     CARDIAC SURGERY      angiogramc     CHOLECYSTECTOMY       COLONOSCOPY       ENT SURGERY      T&A     GENITOURINARY SURGERY      bladder suspension     HEAD & NECK SURGERY      closed skull fracture     ORTHOPEDIC SURGERY Right     rotator cuff repair     VASCULAR SURGERY      endovascular stent graft       Allergies   Allergen Reactions     Celebrex [Celecoxib] Unknown     Contrast Dye      Diatrizoate Unknown     Hydrochlorothiazide      hyponatremia     Indomethacin      Lidocaine Swelling     Lopid [Gemfibrozil]      paradoxical increase in lipids     Repatha [Evolocumab] Other (See Comments)     Sensitivity at sight of injection     Statins [Statins]      myalgia     Sulfa Antibiotics Itching     Tachycardia       Tricor [Fenofibrate]      Vioxx [Rofecoxib]       Ciprofloxacin Rash     Folic Acid-Vit B6-Vit B12 Rash     Folic Acid-Vit B6-Vit B12 Rash     Nystatin Rash      Social History     Tobacco Use     Smoking status: Former     Packs/day: 1.5     Types: Cigarettes     Start date:      Quit date:      Years since quittin.2     Smokeless tobacco: Never   Substance Use Topics     Alcohol use: Yes     Comment: occasional      Wt Readings from Last 1 Encounters:   21 66.1 kg (145 lb 12.8 oz)        Anesthesia Evaluation            ROS/MED HX  ENT/Pulmonary:     (+)                         mild,  COPD,              Neurologic:     (+)              TIA,                  Cardiovascular: Comment: Asc Ao Aneurysm - stable    Carotid dz - mild    (+) Dyslipidemia hypertension- Peripheral Vascular Disease-- Carotid Stenosis.  CAD -  - stent-.                           valvular problems/murmurs type: MR and AI     Previous cardiac testing   Echo: Date: 22 Results:  Cardiac Rhythm: Regular.Study quality: Fair.     Final Impressions:    1. Normal LV size, normal wall thickness, normal global systolic function with an estimated EF of 55 - 60%.    2. Right ventricular cavity size is normal, global systolic RV function is normal.    3. The aortic valve is trileaflet and sclerotic, no stenosis and mild regurgitation.    4. The mitral valve is sclerotic, mild mitral regurgitation.    5. Mildly enlarged left atrium.    6. The inferior vena cava is normal sized, respiratory size variation less than 50%.    7. The ascending aorta is dilated with a maximal diameter of 4.7 cm.     Stress Test:  Date: Results:    ECG Reviewed:  Date: Results:    Cath:  Date: Results:      METS/Exercise Tolerance:     Hematologic:       Musculoskeletal:       GI/Hepatic:     (+) GERD,                   Renal/Genitourinary:  - neg Renal ROS     Endo:  - neg endo ROS     Psychiatric/Substance Use:     (+) psychiatric history anxiety       Infectious Disease:       Malignancy:      "  Other:            Physical Exam    Airway        Mallampati: II   TM distance: > 3 FB   Neck ROM: full   Mouth opening: > 3 cm    Respiratory Devices and Support         Dental       (+) Modest Abnormalities - crowns, retainers, 1 or 2 missing teeth      Cardiovascular   cardiovascular exam normal          Pulmonary   pulmonary exam normal            OUTSIDE LABS:  CBC:   Lab Results   Component Value Date    WBC 10.0 04/29/2011    HGB 12.3 06/30/2021    HGB 14.3 04/29/2011    HCT 41.1 04/29/2011     04/29/2011     BMP:   Lab Results   Component Value Date     06/30/2021     04/29/2011    POTASSIUM 4.6 06/30/2021    POTASSIUM 4.2 06/29/2021    CHLORIDE 106 06/30/2021    CHLORIDE 96 04/29/2011    CO2 25 06/30/2021    CO2 30 04/29/2011    BUN 18 06/30/2021    BUN 13 04/29/2011    CR 0.84 06/30/2021    CR 0.89 06/29/2021     (H) 06/30/2021     (H) 04/29/2011     COAGS: No results found for: \"PTT\", \"INR\", \"FIBR\"  POC: No results found for: \"BGM\", \"HCG\", \"HCGS\"  HEPATIC:   Lab Results   Component Value Date    ALBUMIN 4.4 04/29/2011    PROTTOTAL 7.3 04/29/2011    ALT 26 04/29/2011    AST 44 04/29/2011    ALKPHOS 116 04/29/2011    BILITOTAL 0.4 04/29/2011     OTHER:   Lab Results   Component Value Date    TIA 8.8 06/30/2021    LIPASE 90 04/29/2011       Anesthesia Plan    ASA Status:  3    NPO Status:  NPO Appropriate    Anesthesia Type: Spinal.              Consents    Anesthesia Plan(s) and associated risks, benefits, and realistic alternatives discussed. Questions answered and patient/representative(s) expressed understanding.     - Discussed:     - Discussed with:  Patient            Postoperative Care    Pain management: Multi-modal analgesia.   PONV prophylaxis: Ondansetron (or other 5HT-3), Dexamethasone or Solumedrol, Background Propofol Infusion     Comments:               Alexandr Li, DO, DO    I have reviewed the pertinent notes and labs in the chart from the past " 30 days and (re)examined the patient.  Any updates or changes from those notes are reflected in this note.

## 2024-03-12 ENCOUNTER — APPOINTMENT (OUTPATIENT)
Dept: PHYSICAL THERAPY | Facility: CLINIC | Age: 84
End: 2024-03-12
Attending: ORTHOPAEDIC SURGERY
Payer: COMMERCIAL

## 2024-03-12 ENCOUNTER — APPOINTMENT (OUTPATIENT)
Dept: GENERAL RADIOLOGY | Facility: CLINIC | Age: 84
End: 2024-03-12
Attending: ORTHOPAEDIC SURGERY
Payer: COMMERCIAL

## 2024-03-12 ENCOUNTER — HOSPITAL ENCOUNTER (OUTPATIENT)
Facility: CLINIC | Age: 84
Discharge: HOME | End: 2024-03-13
Attending: ORTHOPAEDIC SURGERY | Admitting: ORTHOPAEDIC SURGERY
Payer: COMMERCIAL

## 2024-03-12 ENCOUNTER — ANESTHESIA (OUTPATIENT)
Dept: SURGERY | Facility: CLINIC | Age: 84
End: 2024-03-12
Payer: COMMERCIAL

## 2024-03-12 DIAGNOSIS — Z96.641 S/P TOTAL RIGHT HIP ARTHROPLASTY: Primary | ICD-10-CM

## 2024-03-12 LAB
ABO/RH(D): NORMAL
ANTIBODY SCREEN: NEGATIVE
FASTING STATUS PATIENT QL REPORTED: YES
GLUCOSE SERPL-MCNC: 132 MG/DL (ref 70–99)
POTASSIUM SERPL-SCNC: 4.5 MMOL/L (ref 3.4–5.3)
SPECIMEN EXPIRATION DATE: NORMAL

## 2024-03-12 PROCEDURE — 250N000013 HC RX MED GY IP 250 OP 250 PS 637: Performed by: PHYSICIAN ASSISTANT

## 2024-03-12 PROCEDURE — 250N000009 HC RX 250: Performed by: NURSE ANESTHETIST, CERTIFIED REGISTERED

## 2024-03-12 PROCEDURE — 97161 PT EVAL LOW COMPLEX 20 MIN: CPT | Mod: GP

## 2024-03-12 PROCEDURE — 99100 ANES PT EXTEME AGE<1 YR&>70: CPT | Performed by: NURSE ANESTHETIST, CERTIFIED REGISTERED

## 2024-03-12 PROCEDURE — 82947 ASSAY GLUCOSE BLOOD QUANT: CPT | Performed by: ANESTHESIOLOGY

## 2024-03-12 PROCEDURE — 250N000011 HC RX IP 250 OP 636: Performed by: NURSE ANESTHETIST, CERTIFIED REGISTERED

## 2024-03-12 PROCEDURE — 97116 GAIT TRAINING THERAPY: CPT | Mod: GP

## 2024-03-12 PROCEDURE — 250N000011 HC RX IP 250 OP 636: Performed by: PHYSICIAN ASSISTANT

## 2024-03-12 PROCEDURE — 250N000011 HC RX IP 250 OP 636: Performed by: ANESTHESIOLOGY

## 2024-03-12 PROCEDURE — 27130 TOTAL HIP ARTHROPLASTY: CPT | Performed by: NURSE ANESTHETIST, CERTIFIED REGISTERED

## 2024-03-12 PROCEDURE — 84132 ASSAY OF SERUM POTASSIUM: CPT | Performed by: ANESTHESIOLOGY

## 2024-03-12 PROCEDURE — 36415 COLL VENOUS BLD VENIPUNCTURE: CPT | Performed by: PHYSICIAN ASSISTANT

## 2024-03-12 PROCEDURE — C1776 JOINT DEVICE (IMPLANTABLE): HCPCS | Performed by: ORTHOPAEDIC SURGERY

## 2024-03-12 PROCEDURE — 710N000009 HC RECOVERY PHASE 1, LEVEL 1, PER MIN: Performed by: ORTHOPAEDIC SURGERY

## 2024-03-12 PROCEDURE — 250N000013 HC RX MED GY IP 250 OP 250 PS 637: Performed by: ORTHOPAEDIC SURGERY

## 2024-03-12 PROCEDURE — 999N000065 XR PELVIS AND HIP PORTABLE RIGHT 1 VIEW

## 2024-03-12 PROCEDURE — 250N000011 HC RX IP 250 OP 636: Performed by: ORTHOPAEDIC SURGERY

## 2024-03-12 PROCEDURE — 27130 TOTAL HIP ARTHROPLASTY: CPT | Performed by: ANESTHESIOLOGY

## 2024-03-12 PROCEDURE — 370N000017 HC ANESTHESIA TECHNICAL FEE, PER MIN: Performed by: ORTHOPAEDIC SURGERY

## 2024-03-12 PROCEDURE — 272N000001 HC OR GENERAL SUPPLY STERILE: Performed by: ORTHOPAEDIC SURGERY

## 2024-03-12 PROCEDURE — C1713 ANCHOR/SCREW BN/BN,TIS/BN: HCPCS | Performed by: ORTHOPAEDIC SURGERY

## 2024-03-12 PROCEDURE — 999N000141 HC STATISTIC PRE-PROCEDURE NURSING ASSESSMENT: Performed by: ORTHOPAEDIC SURGERY

## 2024-03-12 PROCEDURE — 86900 BLOOD TYPING SEROLOGIC ABO: CPT | Performed by: PHYSICIAN ASSISTANT

## 2024-03-12 PROCEDURE — 360N000077 HC SURGERY LEVEL 4, PER MIN: Performed by: ORTHOPAEDIC SURGERY

## 2024-03-12 PROCEDURE — 250N000009 HC RX 250: Performed by: ORTHOPAEDIC SURGERY

## 2024-03-12 PROCEDURE — 258N000003 HC RX IP 258 OP 636: Performed by: ANESTHESIOLOGY

## 2024-03-12 PROCEDURE — 97530 THERAPEUTIC ACTIVITIES: CPT | Mod: GP

## 2024-03-12 PROCEDURE — 258N000003 HC RX IP 258 OP 636: Performed by: ORTHOPAEDIC SURGERY

## 2024-03-12 PROCEDURE — 258N000003 HC RX IP 258 OP 636: Performed by: NURSE ANESTHETIST, CERTIFIED REGISTERED

## 2024-03-12 DEVICE — R3 3 HOLE ACETABULAR SHELL 52MM
Type: IMPLANTABLE DEVICE | Site: HIP | Status: FUNCTIONAL
Brand: R3 ACETABULAR

## 2024-03-12 DEVICE — COBALT CHROME 12/14 TAPER FEMORAL                                    HEAD 36MM -3: Type: IMPLANTABLE DEVICE | Site: HIP | Status: FUNCTIONAL

## 2024-03-12 DEVICE — ANTHOLOGY HIGH OFFSET POROUS SIZE 3
Type: IMPLANTABLE DEVICE | Site: HIP | Status: FUNCTIONAL
Brand: ANTHOLOGY

## 2024-03-12 DEVICE — R3 0 DEGREE +4 XLPE ACETABULAR                                    LINER 36MM INNER DIAMETER X OUTER                                    DIAMETER 52MM
Type: IMPLANTABLE DEVICE | Site: HIP | Status: FUNCTIONAL
Brand: R3

## 2024-03-12 DEVICE — REFLECTION SPHERICAL HEAD SCREW 25MM
Type: IMPLANTABLE DEVICE | Site: HIP | Status: FUNCTIONAL
Brand: REFLECTION

## 2024-03-12 RX ORDER — BISACODYL 10 MG
10 SUPPOSITORY, RECTAL RECTAL DAILY PRN
Status: DISCONTINUED | OUTPATIENT
Start: 2024-03-15 | End: 2024-03-13 | Stop reason: HOSPADM

## 2024-03-12 RX ORDER — ONDANSETRON 2 MG/ML
4 INJECTION INTRAMUSCULAR; INTRAVENOUS EVERY 6 HOURS PRN
Status: DISCONTINUED | OUTPATIENT
Start: 2024-03-12 | End: 2024-03-13 | Stop reason: HOSPADM

## 2024-03-12 RX ORDER — HYDROMORPHONE HYDROCHLORIDE 2 MG/1
4 TABLET ORAL EVERY 4 HOURS PRN
Status: DISCONTINUED | OUTPATIENT
Start: 2024-03-12 | End: 2024-03-13 | Stop reason: HOSPADM

## 2024-03-12 RX ORDER — VANCOMYCIN HYDROCHLORIDE 1 G/20ML
INJECTION, POWDER, LYOPHILIZED, FOR SOLUTION INTRAVENOUS PRN
Status: DISCONTINUED | OUTPATIENT
Start: 2024-03-12 | End: 2024-03-12 | Stop reason: HOSPADM

## 2024-03-12 RX ORDER — AMOXICILLIN 250 MG
1 CAPSULE ORAL 2 TIMES DAILY
Status: DISCONTINUED | OUTPATIENT
Start: 2024-03-12 | End: 2024-03-13 | Stop reason: HOSPADM

## 2024-03-12 RX ORDER — POLYETHYLENE GLYCOL 3350 17 G/17G
17 POWDER, FOR SOLUTION ORAL DAILY
Status: DISCONTINUED | OUTPATIENT
Start: 2024-03-13 | End: 2024-03-13 | Stop reason: HOSPADM

## 2024-03-12 RX ORDER — ACETAMINOPHEN 325 MG/1
650 TABLET ORAL EVERY 4 HOURS PRN
Qty: 100 TABLET | Refills: 0 | Status: SHIPPED | OUTPATIENT
Start: 2024-03-12

## 2024-03-12 RX ORDER — ASPIRIN 81 MG/1
81 TABLET ORAL 2 TIMES DAILY
Qty: 60 TABLET | Refills: 0 | Status: SHIPPED | OUTPATIENT
Start: 2024-03-12

## 2024-03-12 RX ORDER — NALOXONE HYDROCHLORIDE 0.4 MG/ML
0.1 INJECTION, SOLUTION INTRAMUSCULAR; INTRAVENOUS; SUBCUTANEOUS
Status: DISCONTINUED | OUTPATIENT
Start: 2024-03-12 | End: 2024-03-12 | Stop reason: HOSPADM

## 2024-03-12 RX ORDER — FENTANYL CITRATE 0.05 MG/ML
50 INJECTION, SOLUTION INTRAMUSCULAR; INTRAVENOUS EVERY 5 MIN PRN
Status: DISCONTINUED | OUTPATIENT
Start: 2024-03-12 | End: 2024-03-12 | Stop reason: HOSPADM

## 2024-03-12 RX ORDER — PROPOFOL 10 MG/ML
INJECTION, EMULSION INTRAVENOUS PRN
Status: DISCONTINUED | OUTPATIENT
Start: 2024-03-12 | End: 2024-03-12

## 2024-03-12 RX ORDER — ONDANSETRON 2 MG/ML
4 INJECTION INTRAMUSCULAR; INTRAVENOUS EVERY 30 MIN PRN
Status: DISCONTINUED | OUTPATIENT
Start: 2024-03-12 | End: 2024-03-12 | Stop reason: HOSPADM

## 2024-03-12 RX ORDER — TRANEXAMIC ACID 650 MG/1
1950 TABLET ORAL ONCE
Status: COMPLETED | OUTPATIENT
Start: 2024-03-12 | End: 2024-03-12

## 2024-03-12 RX ORDER — HYDROMORPHONE HCL IN WATER/PF 6 MG/30 ML
0.2 PATIENT CONTROLLED ANALGESIA SYRINGE INTRAVENOUS
Status: DISCONTINUED | OUTPATIENT
Start: 2024-03-12 | End: 2024-03-13 | Stop reason: HOSPADM

## 2024-03-12 RX ORDER — NALOXONE HYDROCHLORIDE 0.4 MG/ML
0.2 INJECTION, SOLUTION INTRAMUSCULAR; INTRAVENOUS; SUBCUTANEOUS
Status: DISCONTINUED | OUTPATIENT
Start: 2024-03-12 | End: 2024-03-13 | Stop reason: HOSPADM

## 2024-03-12 RX ORDER — FENTANYL CITRATE 0.05 MG/ML
25 INJECTION, SOLUTION INTRAMUSCULAR; INTRAVENOUS EVERY 5 MIN PRN
Status: DISCONTINUED | OUTPATIENT
Start: 2024-03-12 | End: 2024-03-12 | Stop reason: HOSPADM

## 2024-03-12 RX ORDER — NALOXONE HYDROCHLORIDE 0.4 MG/ML
0.4 INJECTION, SOLUTION INTRAMUSCULAR; INTRAVENOUS; SUBCUTANEOUS
Status: DISCONTINUED | OUTPATIENT
Start: 2024-03-12 | End: 2024-03-13 | Stop reason: HOSPADM

## 2024-03-12 RX ORDER — ONDANSETRON 2 MG/ML
INJECTION INTRAMUSCULAR; INTRAVENOUS PRN
Status: DISCONTINUED | OUTPATIENT
Start: 2024-03-12 | End: 2024-03-12

## 2024-03-12 RX ORDER — HYDROMORPHONE HCL IN WATER/PF 6 MG/30 ML
0.4 PATIENT CONTROLLED ANALGESIA SYRINGE INTRAVENOUS EVERY 5 MIN PRN
Status: DISCONTINUED | OUTPATIENT
Start: 2024-03-12 | End: 2024-03-12 | Stop reason: HOSPADM

## 2024-03-12 RX ORDER — PROPOFOL 10 MG/ML
INJECTION, EMULSION INTRAVENOUS CONTINUOUS PRN
Status: DISCONTINUED | OUTPATIENT
Start: 2024-03-12 | End: 2024-03-12

## 2024-03-12 RX ORDER — HYDROXYZINE HYDROCHLORIDE 10 MG/1
10 TABLET, FILM COATED ORAL EVERY 6 HOURS PRN
Qty: 30 TABLET | Refills: 0 | Status: SHIPPED | OUTPATIENT
Start: 2024-03-12

## 2024-03-12 RX ORDER — SODIUM CHLORIDE, SODIUM LACTATE, POTASSIUM CHLORIDE, CALCIUM CHLORIDE 600; 310; 30; 20 MG/100ML; MG/100ML; MG/100ML; MG/100ML
INJECTION, SOLUTION INTRAVENOUS CONTINUOUS
Status: DISCONTINUED | OUTPATIENT
Start: 2024-03-12 | End: 2024-03-12 | Stop reason: HOSPADM

## 2024-03-12 RX ORDER — ACETAMINOPHEN 325 MG/1
650 TABLET ORAL EVERY 4 HOURS PRN
Status: DISCONTINUED | OUTPATIENT
Start: 2024-03-15 | End: 2024-03-13 | Stop reason: HOSPADM

## 2024-03-12 RX ORDER — CEFAZOLIN SODIUM/WATER 2 G/20 ML
2 SYRINGE (ML) INTRAVENOUS
Status: COMPLETED | OUTPATIENT
Start: 2024-03-12 | End: 2024-03-12

## 2024-03-12 RX ORDER — CEFAZOLIN SODIUM/WATER 2 G/20 ML
2 SYRINGE (ML) INTRAVENOUS SEE ADMIN INSTRUCTIONS
Status: DISCONTINUED | OUTPATIENT
Start: 2024-03-12 | End: 2024-03-12 | Stop reason: HOSPADM

## 2024-03-12 RX ORDER — PROCHLORPERAZINE MALEATE 5 MG
5 TABLET ORAL EVERY 6 HOURS PRN
Status: DISCONTINUED | OUTPATIENT
Start: 2024-03-12 | End: 2024-03-13 | Stop reason: HOSPADM

## 2024-03-12 RX ORDER — SODIUM CHLORIDE, SODIUM LACTATE, POTASSIUM CHLORIDE, CALCIUM CHLORIDE 600; 310; 30; 20 MG/100ML; MG/100ML; MG/100ML; MG/100ML
INJECTION, SOLUTION INTRAVENOUS CONTINUOUS
Status: DISCONTINUED | OUTPATIENT
Start: 2024-03-12 | End: 2024-03-13 | Stop reason: HOSPADM

## 2024-03-12 RX ORDER — ONDANSETRON 4 MG/1
4 TABLET, ORALLY DISINTEGRATING ORAL EVERY 30 MIN PRN
Status: DISCONTINUED | OUTPATIENT
Start: 2024-03-12 | End: 2024-03-12 | Stop reason: HOSPADM

## 2024-03-12 RX ORDER — MAGNESIUM HYDROXIDE 1200 MG/15ML
LIQUID ORAL PRN
Status: DISCONTINUED | OUTPATIENT
Start: 2024-03-12 | End: 2024-03-12 | Stop reason: HOSPADM

## 2024-03-12 RX ORDER — DEXAMETHASONE SODIUM PHOSPHATE 4 MG/ML
INJECTION, SOLUTION INTRA-ARTICULAR; INTRALESIONAL; INTRAMUSCULAR; INTRAVENOUS; SOFT TISSUE PRN
Status: DISCONTINUED | OUTPATIENT
Start: 2024-03-12 | End: 2024-03-12

## 2024-03-12 RX ORDER — HYDROMORPHONE HYDROCHLORIDE 2 MG/1
2-4 TABLET ORAL EVERY 4 HOURS PRN
Qty: 25 TABLET | Refills: 0 | Status: SHIPPED | OUTPATIENT
Start: 2024-03-12

## 2024-03-12 RX ORDER — ACETAMINOPHEN 325 MG/1
975 TABLET ORAL EVERY 8 HOURS
Status: DISCONTINUED | OUTPATIENT
Start: 2024-03-12 | End: 2024-03-13 | Stop reason: HOSPADM

## 2024-03-12 RX ORDER — HYDROMORPHONE HCL IN WATER/PF 6 MG/30 ML
0.2 PATIENT CONTROLLED ANALGESIA SYRINGE INTRAVENOUS EVERY 5 MIN PRN
Status: DISCONTINUED | OUTPATIENT
Start: 2024-03-12 | End: 2024-03-12 | Stop reason: HOSPADM

## 2024-03-12 RX ORDER — HYDROXYZINE HYDROCHLORIDE 10 MG/1
10 TABLET, FILM COATED ORAL EVERY 6 HOURS PRN
Status: DISCONTINUED | OUTPATIENT
Start: 2024-03-12 | End: 2024-03-13 | Stop reason: HOSPADM

## 2024-03-12 RX ORDER — GLYCOPYRROLATE 0.2 MG/ML
INJECTION, SOLUTION INTRAMUSCULAR; INTRAVENOUS PRN
Status: DISCONTINUED | OUTPATIENT
Start: 2024-03-12 | End: 2024-03-12

## 2024-03-12 RX ORDER — CEFAZOLIN SODIUM 1 G/3ML
1 INJECTION, POWDER, FOR SOLUTION INTRAMUSCULAR; INTRAVENOUS EVERY 8 HOURS
Qty: 10 ML | Refills: 0 | Status: COMPLETED | OUTPATIENT
Start: 2024-03-12 | End: 2024-03-13

## 2024-03-12 RX ORDER — HYDROMORPHONE HCL IN WATER/PF 6 MG/30 ML
0.4 PATIENT CONTROLLED ANALGESIA SYRINGE INTRAVENOUS
Status: DISCONTINUED | OUTPATIENT
Start: 2024-03-12 | End: 2024-03-13 | Stop reason: HOSPADM

## 2024-03-12 RX ORDER — DEXMEDETOMIDINE HYDROCHLORIDE 4 UG/ML
INJECTION, SOLUTION INTRAVENOUS PRN
Status: DISCONTINUED | OUTPATIENT
Start: 2024-03-12 | End: 2024-03-12

## 2024-03-12 RX ORDER — AMOXICILLIN 250 MG
1-2 CAPSULE ORAL 2 TIMES DAILY
Qty: 30 TABLET | Refills: 0 | Status: SHIPPED | OUTPATIENT
Start: 2024-03-12

## 2024-03-12 RX ORDER — ONDANSETRON 4 MG/1
4 TABLET, ORALLY DISINTEGRATING ORAL EVERY 6 HOURS PRN
Status: DISCONTINUED | OUTPATIENT
Start: 2024-03-12 | End: 2024-03-13 | Stop reason: HOSPADM

## 2024-03-12 RX ORDER — HYDROMORPHONE HYDROCHLORIDE 2 MG/1
2 TABLET ORAL EVERY 4 HOURS PRN
Status: DISCONTINUED | OUTPATIENT
Start: 2024-03-12 | End: 2024-03-13 | Stop reason: HOSPADM

## 2024-03-12 RX ORDER — ASPIRIN 81 MG/1
81 TABLET ORAL 2 TIMES DAILY
Status: DISCONTINUED | OUTPATIENT
Start: 2024-03-12 | End: 2024-03-13 | Stop reason: HOSPADM

## 2024-03-12 RX ADMIN — PROPOFOL 100 MCG/KG/MIN: 10 INJECTION, EMULSION INTRAVENOUS at 08:04

## 2024-03-12 RX ADMIN — CEFAZOLIN 1 G: 1 INJECTION, POWDER, FOR SOLUTION INTRAMUSCULAR; INTRAVENOUS at 16:14

## 2024-03-12 RX ADMIN — GLYCOPYRROLATE 0.1 MG: 0.2 INJECTION, SOLUTION INTRAMUSCULAR; INTRAVENOUS at 08:35

## 2024-03-12 RX ADMIN — HYDROMORPHONE HYDROCHLORIDE 0.4 MG: 0.2 INJECTION, SOLUTION INTRAMUSCULAR; INTRAVENOUS; SUBCUTANEOUS at 10:20

## 2024-03-12 RX ADMIN — PROPOFOL 30 MG: 10 INJECTION, EMULSION INTRAVENOUS at 08:04

## 2024-03-12 RX ADMIN — ASPIRIN 81 MG: 81 TABLET, COATED ORAL at 20:09

## 2024-03-12 RX ADMIN — ACETAMINOPHEN 975 MG: 325 TABLET, FILM COATED ORAL at 20:09

## 2024-03-12 RX ADMIN — HYDROMORPHONE HYDROCHLORIDE 2 MG: 2 TABLET ORAL at 13:46

## 2024-03-12 RX ADMIN — DEXAMETHASONE SODIUM PHOSPHATE 10 MG: 4 INJECTION, SOLUTION INTRA-ARTICULAR; INTRALESIONAL; INTRAMUSCULAR; INTRAVENOUS; SOFT TISSUE at 08:10

## 2024-03-12 RX ADMIN — HYDROMORPHONE HYDROCHLORIDE 4 MG: 2 TABLET ORAL at 17:59

## 2024-03-12 RX ADMIN — ACETAMINOPHEN 975 MG: 325 TABLET, FILM COATED ORAL at 12:55

## 2024-03-12 RX ADMIN — SODIUM CHLORIDE, POTASSIUM CHLORIDE, SODIUM LACTATE AND CALCIUM CHLORIDE: 600; 310; 30; 20 INJECTION, SOLUTION INTRAVENOUS at 23:03

## 2024-03-12 RX ADMIN — FENTANYL CITRATE 50 MCG: 50 INJECTION, SOLUTION INTRAMUSCULAR; INTRAVENOUS at 10:00

## 2024-03-12 RX ADMIN — Medication 2 G: at 07:54

## 2024-03-12 RX ADMIN — DOCUSATE SODIUM 50 MG AND SENNOSIDES 8.6 MG 1 TABLET: 8.6; 5 TABLET, FILM COATED ORAL at 12:55

## 2024-03-12 RX ADMIN — ASPIRIN 81 MG: 81 TABLET, COATED ORAL at 12:55

## 2024-03-12 RX ADMIN — HYDROMORPHONE HYDROCHLORIDE 0.4 MG: 0.2 INJECTION, SOLUTION INTRAMUSCULAR; INTRAVENOUS; SUBCUTANEOUS at 11:08

## 2024-03-12 RX ADMIN — PROPOFOL 20 MG: 10 INJECTION, EMULSION INTRAVENOUS at 09:37

## 2024-03-12 RX ADMIN — HYDROMORPHONE HYDROCHLORIDE 0.4 MG: 0.2 INJECTION, SOLUTION INTRAMUSCULAR; INTRAVENOUS; SUBCUTANEOUS at 10:45

## 2024-03-12 RX ADMIN — DOCUSATE SODIUM 50 MG AND SENNOSIDES 8.6 MG 1 TABLET: 8.6; 5 TABLET, FILM COATED ORAL at 20:09

## 2024-03-12 RX ADMIN — PROPOFOL 10 MG: 10 INJECTION, EMULSION INTRAVENOUS at 07:58

## 2024-03-12 RX ADMIN — DEXMEDETOMIDINE HYDROCHLORIDE 8 MCG: 200 INJECTION INTRAVENOUS at 08:33

## 2024-03-12 RX ADMIN — HYDROMORPHONE HYDROCHLORIDE 2 MG: 2 TABLET ORAL at 13:01

## 2024-03-12 RX ADMIN — DEXMEDETOMIDINE HYDROCHLORIDE 8 MCG: 200 INJECTION INTRAVENOUS at 08:42

## 2024-03-12 RX ADMIN — ONDANSETRON 4 MG: 2 INJECTION INTRAMUSCULAR; INTRAVENOUS at 09:28

## 2024-03-12 RX ADMIN — TRANEXAMIC ACID 1950 MG: 650 TABLET ORAL at 06:46

## 2024-03-12 RX ADMIN — DEXMEDETOMIDINE HYDROCHLORIDE 4 MCG: 200 INJECTION INTRAVENOUS at 09:37

## 2024-03-12 RX ADMIN — PROPOFOL 10 MG: 10 INJECTION, EMULSION INTRAVENOUS at 08:01

## 2024-03-12 RX ADMIN — SODIUM CHLORIDE, POTASSIUM CHLORIDE, SODIUM LACTATE AND CALCIUM CHLORIDE: 600; 310; 30; 20 INJECTION, SOLUTION INTRAVENOUS at 06:48

## 2024-03-12 RX ADMIN — GLYCOPYRROLATE 0.1 MG: 0.2 INJECTION, SOLUTION INTRAMUSCULAR; INTRAVENOUS at 08:18

## 2024-03-12 RX ADMIN — HYDROMORPHONE HYDROCHLORIDE 4 MG: 2 TABLET ORAL at 22:16

## 2024-03-12 RX ADMIN — PHENYLEPHRINE HYDROCHLORIDE 100 MCG: 10 INJECTION INTRAVENOUS at 08:15

## 2024-03-12 RX ADMIN — MEPIVACAINE HYDROCHLORIDE 2.5 ML: 20 INJECTION, SOLUTION EPIDURAL; INFILTRATION at 08:04

## 2024-03-12 RX ADMIN — PROPOFOL 10 MG: 10 INJECTION, EMULSION INTRAVENOUS at 09:38

## 2024-03-12 RX ADMIN — PROPOFOL 20 MG: 10 INJECTION, EMULSION INTRAVENOUS at 08:16

## 2024-03-12 RX ADMIN — PHENYLEPHRINE HYDROCHLORIDE 0.2 MCG/KG/MIN: 10 INJECTION INTRAVENOUS at 08:09

## 2024-03-12 ASSESSMENT — ACTIVITIES OF DAILY LIVING (ADL)
ADLS_ACUITY_SCORE: 21
ADLS_ACUITY_SCORE: 21
ADLS_ACUITY_SCORE: 22
ADLS_ACUITY_SCORE: 21
ADLS_ACUITY_SCORE: 38
ADLS_ACUITY_SCORE: 21
ADLS_ACUITY_SCORE: 22
ADLS_ACUITY_SCORE: 21
ADLS_ACUITY_SCORE: 22
ADLS_ACUITY_SCORE: 21
ADLS_ACUITY_SCORE: 36
ADLS_ACUITY_SCORE: 21

## 2024-03-12 NOTE — OP NOTE
Procedure Date: March 12, 2024    PATIENT: Charisse Dumas  1940    PREOPERATIVE DIAGNOSIS:  Right hip advanced osteoarthritis.     POSTOPERATIVE DIAGNOSIS:  Right hip advanced osteoarthritis.     PROCEDURE:  Right total hip arthroplasty, posterior approach, uncemented.     SURGEON:  Gerard Dial MD.     ASSISTANT:  Cortney Shay PA-C.     COMPLICATIONS:  None.     ESTIMATED BLOOD LOSS:  150 mL.     IMPLANTS:  [employer]:  1.  Size 52 R3 uncemented acetabular component.  2.  25 mm acetabular shell screw.  3.  +4 offset 0-degree highly cross-linked polyethylene liner for a 52 cup and 36 head.  4.  -3, 36 mm cobalt chrome femoral head.  5.  Size 3, nga offset Anthology uncemented femoral stem.       The patient is brought to the operating room March 12, 2024 for right total hip arthroplasty.  Patient has history of progressively worsening pain and dysfunction refractory to conservative measures. Seen day of surgery in the preoperative holding area and the right hip was marked as the correct operative site.  Received a dose of IV antibiotic within 30 minutes prior to surgical incision.  Post-surgical antibiotic and DVT prophylaxis are indicated and will be prescribed.  Skilled assistant was used for positioning, draping, retraction, closure, and dressing application.     The patient was brought to the operating room and placed under a spinal anesthesia.  Positioned in lateral decubitus with the right hip up.  The lower extremity was prepped and draped in the standard sterile fashion and preoperative timeout was taken.  Preoperative assessment of limb lengths performed.  Posterolateral surgical incision was made over the right hip.  Dissection was carried down to the deep fascia, which was incised longitudinally.  Posterior hip approach then performed, including a T capsulotomy.  Femoral head was dislocated from the acetabulum and the femoral neck was cut 5 mm proximal to the lesser trochanter.  The  femoral head was removed and deep retractors were used to expose the acetabulum.  Pulvinar, labrum, and osteophytes debrided.  I reamed the acetabulum up to a 52 and trialed, selecting a 52 acetabular component.  A 52 mm R3 uncemented acetabular component impacted into the prepared acetabulum into a position of approximately 45 degrees of abduction and 20 degrees of anteversion.  Two 25 mm acetabular shell screws were placed into a superior drill hole and trial liner was placed.  I initially prepared the acetabulum to receive a 50 mm cup.  Unfortunately the 50 mm cup was determined to be undersized.  The 52 cup fit much better with excellent press fit fixation.    Femur was then addressed with a box osteotome, canal finder, and sequential broaching for the Anthology uncemented stem.  I broached up to a 3.  With the size 3 broach seated, I trialed and selected a -3, 36 mm trial femoral head and a high offset trial modular neck.  With this combination of trial implants assembled and with the trial prosthesis reduced, I was pleased with length, offset, and stability.  Trial components were removed.    Jet lavage irrigation performed.  I implanted the +4 offset 0-degree highly cross-linked polyethylene liner for the 52 cup and 36 head.  I implanted the size 3, high offset Anthology uncemented femoral stem.  I implanted the -3, 36 mm cobalt chrome femoral head.  Prosthesis reduced.  Length and stability again confirmed to be excellent.  Betadine wash performed.  Jet lavage irrigation performed.  Posterior capsule and piriformis tendon repaired over a gram of vancomycin powder.  Deep fascia closed with interrupted Ethibond sutures.  Superficial soft tissues were irrigated and closed in layers.  Sterile dressing applied.  Hip abduction pillow applied. The patient was brought to recovery in stable condition.  Needle and lap counts were correct at the end of the case. There were no known complications.    Additional  intraoperative findings of note: None    Special postsurgical patient care factors: None     Gerard Dial MD

## 2024-03-12 NOTE — BRIEF OP NOTE
[facility]    Brief Operative Note    Pre-operative diagnosis: Osteoarthritis of right hip [M16.11]  Post-operative diagnosis Same as pre-operative diagnosis    Procedure: RIGHT TOTAL HIP ARTHROPLASTY, Right - Hip    Surgeon: Surgeon(s) and Role:     * Gerard Dial MD - Primary     * Cortney Shay PA-C - Assisting  Anesthesia: General   Estimated Blood Loss: 150    Drains: None  Specimens: * No specimens in log *  Findings:   None.  Complications: None.  Implants:   Implant Name Type Inv. Item Serial No.  Lot No. LRB No. Used Action   IMP SCR ACET SNN SPHERICAL HEAD 6.5X25MM [id number] - [id number] Metallic Hardware/Collins IMP SCR ACET SNN SPHERICAL HEAD 6.5X25MM [id number]  [employer] [id number] Right 1 Implanted   IMP SHELL SNR ACET R3 3H 50MM [id number] - [id number] Total Joint Component/Insert IMP SHELL SNR ACET R3 3H 50MM [id number]  [employer] [id number] Right 1 Wasted   IMP SHELL SNR ACET R3 3H 52MM [id number] - [id number] Total Joint Component/Insert IMP SHELL SNR ACET R3 3H 52MM [id number]  [employer] [id number] Right 1 Implanted   IMP SCR ACET SNN SPHERICAL HEAD 6.5X25MM [id number] - [id number] Metallic Hardware/Collins IMP SCR ACET SNN SPHERICAL HEAD 6.5X25MM [id number]  [employer] [id number] Right 1 Implanted   IMP STEM SNN HIGH OFFSET SZ 3 [id number] - [id number] Total Joint Component/Insert IMP STEM SNN HIGH OFFSET SZ 3 [id number]  [employer] [id number] Right 1 Implanted   IMP LINER S&N ACET R3 +4 XLPE [id number] 0DEG [id number] - [id number] Total Joint Component/Insert IMP LINER S&N ACET R3 +4 XLPE [id number] 0DEG [id number]  [employer] [id number] Right 1 Implanted   IMP HEAD FEMORAL SNR [place] 36MM -3 [id number] - [id number] Total Joint Component/Insert IMP HEAD FEMORAL SNR [place] 36MM -3 [id number]  [employer] [id number] Right 1 Implanted

## 2024-03-12 NOTE — PROGRESS NOTES
03/12/24 1600   Appointment Info   Signing Clinician's Name / Credentials (PT) John Sierra DPT   Rehab Comments (PT) (S)  WBAT RLE, No: IR, ADD past midline, flex>90, pivoting twisting   Quick Adds   Quick Adds Certification   Living Environment   People in Home alone   Current Living Arrangements house   Home Accessibility stairs within home   Number of Stairs, Within Home, Primary seven  (split level with 7 up and down)   Stair Railings, Within Home, Primary railing on left side (ascending);railing on right side (ascending)   Transportation Anticipated family or friend will provide   Living Environment Comments Pt lives in split level house alone but daughter will come and stay for 2 weeks,   Self-Care   Usual Activity Tolerance moderate   Current Activity Tolerance moderate   Equipment Currently Used at Home cane, straight   Fall history within last six months no   Activity/Exercise/Self-Care Comment Pt IND with mobility and ADL's, but was using SPC last month prior to surgery   General Information   Onset of Illness/Injury or Date of Surgery 03/12/24   Referring Physician Gerard Dial MD   Patient/Family Therapy Goals Statement (PT) go home with home PT   Pertinent History of Current Problem (include personal factors and/or comorbidities that impact the POC) Pt is a 83 y.o. female s/p R LESLIE on 3/12/2024, POD#0   Existing Precautions/Restrictions fall;no hip IR;no hip ADD past midline;90 degree hip flexion;no pivoting or twisting;weight bearing   Weight-Bearing Status - LLE full weight-bearing   Weight-Bearing Status - RLE weight-bearing as tolerated   Cognition   Affect/Mental Status (Cognition) WNL   Orientation Status (Cognition) oriented x 4   Follows Commands (Cognition) WNL   Pain Assessment   Patient Currently in Pain Yes, see Vital Sign flowsheet  (7/10 R hip)   Integumentary/Edema   Integumentary/Edema no deficits were identifed   Posture    Posture Forward head position   Range of Motion  (ROM)   Range of Motion ROM deficits secondary to surgical procedure;ROM deficits secondary to pain;ROM deficits secondary to weakness   ROM Comment Deficits with R hip post-op and due to hip precautions, otherwise appears grossly WFL   Strength (Manual Muscle Testing)   Strength (Manual Muscle Testing) Able to perform L SLR;Deficits observed during functional mobility   Strength Comments not formally assessed, deficits noted with R hip post-op   Bed Mobility   Comment, (Bed Mobility) sit>supine CGA   Transfers   Comment, (Transfers) sit<>stand with FWW CGA   Gait/Stairs (Locomotion)   Condon Level (Gait) contact guard   Assistive Device (Gait) walker, front-wheeled   Distance in Feet (Gait) 10'   Pattern (Gait) step-through   Deviations/Abnormal Patterns (Gait) antalgic;festinating/shuffling;gait speed decreased;stride length decreased;weight shifting decreased   Maintains Weight-bearing Status (Gait) able to maintain   Balance   Balance Comments impaired dynamic balance, able to amb with FWW   Sensory Examination   Sensory Perception patient reports no sensory changes   Clinical Impression   Criteria for Skilled Therapeutic Intervention Yes, treatment indicated   PT Diagnosis (PT) impaired gait   Influenced by the following impairments pain, deficits with ROM, strength, balance   Functional limitations due to impairments bed mobility, transfers, amb, ADL's   Clinical Presentation (PT Evaluation Complexity) stable   Clinical Presentation Rationale PMH and clinical judgement   Clinical Decision Making (Complexity) low complexity   Planned Therapy Interventions (PT) balance training;bed mobility training;cryotherapy;gait training;patient/family education;ROM (range of motion);stair training;strengthening;stretching;transfer training;progressive activity/exercise   Risk & Benefits of therapy have been explained evaluation/treatment results reviewed;care plan/treatment goals reviewed;risks/benefits  reviewed;participants voiced agreement with care plan;current/potential barriers reviewed;participants included;patient;daughter   PT Total Evaluation Time   PT Eval, Low Complexity Minutes (28388) 10   Therapy Certification   Start of care date 03/12/24   Certification date from 03/12/24   Certification date to 03/19/24   Medical Diagnosis R LESLIE   Physical Therapy Goals   PT Frequency 2x/day   PT Predicted Duration/Target Date for Goal Attainment 03/13/24   PT Goals Bed Mobility;Transfers;Gait;Stairs   PT: Bed Mobility Supervision/stand-by assist;Supine to/from sit;Rolling;Bridging;Within precautions   PT: Transfers Supervision/stand-by assist;Sit to/from stand;Assistive device;Within precautions   PT: Gait Supervision/stand-by assist;Rolling walker;Within precautions;100 feet   PT: Stairs Minimal assist;7 stairs;Rail on left;Rail on right  (split level home with 7 steps up and down)   Interventions   Interventions Quick Adds Gait Training;Therapeutic Activity;Therapeutic Procedure   Therapeutic Procedure/Exercise   Ther. Procedure: strength, endurance, ROM, flexibillity Minutes (24417) 1   Symptoms Noted During/After Treatment none   Treatment Detail/Skilled Intervention Educated on circulatory benefits of AP's after surgery and to perform throughout the day whenever resting. With pt in supine cued for AP's x 30 and pt tolerated well   Therapeutic Activity   Therapeutic Activities: dynamic activities to improve functional performance Minutes (79222) 18   Symptoms Noted During/After Treatment Increased pain   Treatment Detail/Skilled Intervention Pt greeted sitting on BSC with daughter in room. Eval completed and treatment indicated. Pt educated on benefits of OOB activity and orders for WBAT and posterior hip precautions, pt verbalized understanding and was left with hip precautions handout. Pt performed sit>stand with BSC with FWW and CGA, cues for hand placement, walker placement, and putting R foot ahead of L  to reduce hip flexion. Once standing pt able to perform pericares without assist. Pt frequently taking both hands off walker trying to manage lines herself, needing cues to keep hands on walker. After walking pt performed stand>sit on bed with FWW and CGA, pt moving very slowly and cautiously, cues for reaching back towards bed when sitting. Pt performed sit>supine with CGA cues for sequencing and maintaining hip precautions and pt did well. Pt left supine in bed with nurse in room for direct handoff   Gait Training   Gait Training Minutes (65589) 8   Symptoms Noted During/After Treatment (Gait Training) increased pain   Treatment Detail/Skilled Intervention Pt amb ~50' with FWW and CGA, pt moved very slowly with slow cautious movements. Short step length with step-through pattern. Antalgic and mildly unstable but no overt LOB. Cues for safe walker placement and forward gaze, pt likes to look at feet.  Pt took several standing rest breaks.   Distance in Feet 50'   Foster Level (Gait Training) contact guard   Physical Assistance Level (Gait Training) 1 person assist   Weight Bearing (Gait Training) weight-bearing as tolerated   Assistive Device (Gait Training) rolling walker   PT Discharge Planning   PT Plan review hip precautions, progress gait with FWW, trial steps, progress transfers and bed mobility   PT Discharge Recommendation (DC Rec)   (defer to ortho)   PT Rationale for DC Rec Pt below baseline but ambulated slowly 50' with FWW and following hip precautions well. Anticipate pt will progress and by discharge be at/near SBA bed mobility, SBA transfers with FWW, SBA amb with FWW, Danika for steps. Pt will have good 24/7 support at home from daughter who will stay with her for 2 weeks with home PT   PT Brief overview of current status CGA with FWW   Total Session Time   Timed Code Treatment Minutes 27   Total Session Time (sum of timed and untimed services) 37       [facility]  Services  OUTPATIENT PHYSICAL THERAPY EVALUATION  PLAN OF TREATMENT FOR OUTPATIENT REHABILITATION  (COMPLETE FOR INITIAL CLAIMS ONLY)  Patient's Last Name, First Name, M.I.  YOB: 1940  Charisse Dumas                        Provider's Name  [facility] Medical Record No.  [id number]                             Onset Date:  03/12/24   Start of Care Date:  03/12/24   Type:     _X_PT   ___OT   ___SLP Medical Diagnosis:  R LESLIE              PT Diagnosis:  impaired gait Visits from SOC:  1     See note for plan of treatment, functional goals and certification details    I CERTIFY THE NEED FOR THESE SERVICES FURNISHED UNDER        THIS PLAN OF TREATMENT AND WHILE UNDER MY CARE     (Physician co-signature of this document indicates review and certification of the therapy plan).

## 2024-03-12 NOTE — ANESTHESIA CARE TRANSFER NOTE
Patient: Charisse Dumas    Procedure: Procedure(s):  RIGHT TOTAL HIP ARTHROPLASTY       Diagnosis: Osteoarthritis of right hip [M16.11]  Diagnosis Additional Information: No value filed.    Anesthesia Type:   Spinal     Note:    Oropharynx: oropharynx clear of all foreign objects and spontaneously breathing  Level of Consciousness: awake  Oxygen Supplementation: room air    Independent Airway: airway patency satisfactory and stable  Dentition: dentition unchanged  Vital Signs Stable: post-procedure vital signs reviewed and stable  Report to RN Given: handoff report given  Patient transferred to: PACU    Handoff Report: Identifed the Patient, Identified the Reponsible Provider, Reviewed the pertinent medical history, Discussed the surgical course, Reviewed Intra-OP anesthesia mangement and issues during anesthesia, Set expectations for post-procedure period and Allowed opportunity for questions and acknowledgement of understanding  Vitals:  Vitals Value Taken Time   /52 03/12/24 0953   Temp     Pulse 51 03/12/24 0955   Resp 8 03/12/24 0955   SpO2 93% 03/12/24 0955   Vitals shown include unfiled device data.    Electronically Signed By: JUAN PABLO Fiore CRNA  March 12, 2024  9:56 AM

## 2024-03-12 NOTE — ADDENDUM NOTE
Addendum  created 03/12/24 5171 by Alexandr Li DO    Clinical Note Signed, Intraprocedure Blocks edited, SmartForm saved

## 2024-03-12 NOTE — ANESTHESIA PROCEDURE NOTES
"Intrathecal Procedure Note    Pre-Procedure   Staff -        Anesthesiologist:  Alexandr Li DO       Performed By: anesthesiologist       Location: OR       Pre-Anesthestic Checklist: patient identified, IV checked, site marked, risks and benefits discussed, informed consent, monitors and equipment checked, pre-op evaluation, at physician/surgeon's request and post-op pain management  Timeout:       Correct Patient: Yes        Correct Procedure: Yes        Correct Site: Yes        Correct Position: Yes   Procedure Documentation  Procedure: intrathecal       Patient Position: sitting       Patient Prep/Sterile Barriers: sterile gloves, mask, patient draped       Skin prep: Betadine       Insertion Site: L3-4. (midline approach).       Spinal Needle Type: Lyn tip       Introducer used       Introducer: 20 G       # of attempts: 1 and  # of redirects:     Assessment/Narrative         Paresthesias: No.       CSF fluid: clear.       Opening pressure was cmH2O while  Sitting.        FOR [facility] ([facility]) ONLY:   Pain Team Contact information: please page the Pain Team Via "[employer]. Search \"Pain\". During daytime hours, please page the attending first. At night please page the resident first.      "

## 2024-03-12 NOTE — ANESTHESIA POSTPROCEDURE EVALUATION
Patient: Charisse Dumas    Procedure: Procedure(s):  RIGHT TOTAL HIP ARTHROPLASTY       Anesthesia Type:  Spinal    Note:     Postop Pain Control: Uneventful            Sign Out: Well controlled pain   PONV: No   Neuro/Psych: Uneventful            Sign Out: Acceptable/Baseline neuro status   Airway/Respiratory: Uneventful            Sign Out: Acceptable/Baseline resp. status   CV/Hemodynamics: Uneventful            Sign Out: Acceptable CV status; No obvious hypovolemia; No obvious fluid overload   Other NRE: NONE   DID A NON-ROUTINE EVENT OCCUR?        Last vitals:  Vitals Value Taken Time   /86 03/12/24 1115   Temp 36.6  C (97.9  F) 03/12/24 1115   Pulse 44 03/12/24 1127   Resp 10 03/12/24 1127   SpO2 96 % 03/12/24 1127   Vitals shown include unfiled device data.    Electronically Signed By: Alexandr Li DO, DO  March 12, 2024  1:51 PM

## 2024-03-12 NOTE — PLAN OF CARE
Patient vital signs are at baseline: Yes  Patient able to ambulate as they were prior to admission or with assist devices provided by therapies during their stay:  Yes  Patient MUST void prior to discharge:  Yes  Patient able to tolerate oral intake:  Yes  Pain has adequate pain control using Oral analgesics:  Yes  Does patient have an identified :  Yes  Has goal D/C date and time been discussed with patient:  Yes     Diagnosis: RIGHT TOTAL HIP ARTHROPLASTY   POD#: POD 0  Mental Status: A&O x4  Activity/dangle: Ast of 1 GB/W  Diet: Regular diet  Pain: Managed with PO Dilaudid  Mahmood/Voiding: Voiding adequately  Tele/Restraints/Iso:N/A  02/LDA: Room air. LR infusing at 85 mL/hr  D/C Date: Possible discharge 3/13/2024  Other Info: CMS intact. Dressing on R Hip is CDI.

## 2024-03-12 NOTE — PROGRESS NOTES
Postop Check R LESLIE    Doing well  AVSS  CMS intact  Dressing CDI  XRay looks good  PT/Aspirin  WBAT/Posterior Precautions    Gerard Dial MD

## 2024-03-13 ENCOUNTER — APPOINTMENT (OUTPATIENT)
Dept: PHYSICAL THERAPY | Facility: CLINIC | Age: 84
End: 2024-03-13
Attending: ORTHOPAEDIC SURGERY
Payer: COMMERCIAL

## 2024-03-13 ENCOUNTER — APPOINTMENT (OUTPATIENT)
Dept: OCCUPATIONAL THERAPY | Facility: CLINIC | Age: 84
End: 2024-03-13
Attending: ORTHOPAEDIC SURGERY
Payer: COMMERCIAL

## 2024-03-13 VITALS
OXYGEN SATURATION: 92 % | HEART RATE: 71 BPM | HEIGHT: 60 IN | SYSTOLIC BLOOD PRESSURE: 123 MMHG | BODY MASS INDEX: 29.84 KG/M2 | WEIGHT: 152 LBS | TEMPERATURE: 99.4 F | RESPIRATION RATE: 16 BRPM | DIASTOLIC BLOOD PRESSURE: 38 MMHG

## 2024-03-13 LAB
FASTING STATUS PATIENT QL REPORTED: YES
GLUCOSE BLDC GLUCOMTR-MCNC: 145 MG/DL (ref 70–99)
GLUCOSE BLDC GLUCOMTR-MCNC: 170 MG/DL (ref 70–99)
GLUCOSE SERPL-MCNC: 146 MG/DL (ref 70–99)
HGB BLD-MCNC: 11.8 G/DL (ref 11.7–15.7)

## 2024-03-13 PROCEDURE — 82947 ASSAY GLUCOSE BLOOD QUANT: CPT | Performed by: ORTHOPAEDIC SURGERY

## 2024-03-13 PROCEDURE — 97530 THERAPEUTIC ACTIVITIES: CPT | Mod: GP | Performed by: PHYSICAL THERAPY ASSISTANT

## 2024-03-13 PROCEDURE — 82962 GLUCOSE BLOOD TEST: CPT

## 2024-03-13 PROCEDURE — 97165 OT EVAL LOW COMPLEX 30 MIN: CPT | Mod: GO

## 2024-03-13 PROCEDURE — 97116 GAIT TRAINING THERAPY: CPT | Mod: GP | Performed by: PHYSICAL THERAPY ASSISTANT

## 2024-03-13 PROCEDURE — 250N000011 HC RX IP 250 OP 636: Performed by: ORTHOPAEDIC SURGERY

## 2024-03-13 PROCEDURE — 36415 COLL VENOUS BLD VENIPUNCTURE: CPT | Performed by: ORTHOPAEDIC SURGERY

## 2024-03-13 PROCEDURE — 97110 THERAPEUTIC EXERCISES: CPT | Mod: GP | Performed by: PHYSICAL THERAPY ASSISTANT

## 2024-03-13 PROCEDURE — 250N000013 HC RX MED GY IP 250 OP 250 PS 637: Performed by: ORTHOPAEDIC SURGERY

## 2024-03-13 PROCEDURE — 97535 SELF CARE MNGMENT TRAINING: CPT | Mod: GO

## 2024-03-13 PROCEDURE — 85018 HEMOGLOBIN: CPT | Performed by: ORTHOPAEDIC SURGERY

## 2024-03-13 RX ADMIN — POLYETHYLENE GLYCOL 3350 17 G: 17 POWDER, FOR SOLUTION ORAL at 10:05

## 2024-03-13 RX ADMIN — ASPIRIN 81 MG: 81 TABLET, COATED ORAL at 10:05

## 2024-03-13 RX ADMIN — CEFAZOLIN 1 G: 1 INJECTION, POWDER, FOR SOLUTION INTRAMUSCULAR; INTRAVENOUS at 00:12

## 2024-03-13 RX ADMIN — HYDROMORPHONE HYDROCHLORIDE 4 MG: 2 TABLET ORAL at 03:07

## 2024-03-13 RX ADMIN — ACETAMINOPHEN 975 MG: 325 TABLET, FILM COATED ORAL at 13:45

## 2024-03-13 RX ADMIN — HYDROMORPHONE HYDROCHLORIDE 4 MG: 2 TABLET ORAL at 10:05

## 2024-03-13 RX ADMIN — ACETAMINOPHEN 975 MG: 325 TABLET, FILM COATED ORAL at 04:11

## 2024-03-13 RX ADMIN — DOCUSATE SODIUM 50 MG AND SENNOSIDES 8.6 MG 1 TABLET: 8.6; 5 TABLET, FILM COATED ORAL at 10:05

## 2024-03-13 ASSESSMENT — ACTIVITIES OF DAILY LIVING (ADL)
ADLS_ACUITY_SCORE: 21

## 2024-03-13 NOTE — PROGRESS NOTES
Orthopedic Surgery  Charisse Dumas  03/13/2024     Admit Date:  3/12/2024    POD: 1 Day Post-Op   Procedure(s):  RIGHT TOTAL HIP ARTHROPLASTY    Patient resting comfortably in bed.    Pain controlled.  Tolerating oral intake.    Voiding adequately  Denies nausea or vomiting.  Denies chest pain or shortness of breath.  No acute events overnight.    Temp:  [97.7  F (36.5  C)-99.3  F (37.4  C)] 98.7  F (37.1  C)  Pulse:  [44-90] 69  Resp:  [10-24] 16  BP: ()/(52-86) 126/54  SpO2:  [92 %-98 %] 93 %    Alert and oriented.   Right dressing is clean, dry, and intact.   Bilateral calves are soft, non-tender.  Right lower extremity is NVI.  Patient able to resist ankle dorsiflexion and plantar flexion bilaterally.  DP pulse palpable.  Sensation intact bilateral lower extremities.      A/P    DVT prophylaxis: Aspirin  Activity:  WBAT/Posterior hip precautions  Pain: Dilaudid/tylenol.  Dressings: daily island dressing. Change prior to discharge.    Discharge planning: Anticipate discharge to home with home care later this morning.     Follow-up: 2 weeks post-op with Cortney Shay PA-C/Gerard Shay PA-C  [facility]

## 2024-03-13 NOTE — PROGRESS NOTES
03/13/24 0900   Appointment Info   Signing Clinician's Name / Credentials (OT) Oneida Sheldon, OTR/L   Rehab Comments (OT) Posterior hip prec; WBAT RLE   Quick Adds   Quick Adds Certification   Living Environment   People in Home alone   Current Living Arrangements house   Home Accessibility stairs within home   Number of Stairs, Within Home, Primary seven  (split level with 7 up and down)   Stair Railings, Within Home, Primary railing on left side (ascending);railing on right side (ascending)   Transportation Anticipated family or friend will provide   Living Environment Comments Pt lives in split level house alone but daughter will come and stay for 2 weeks, walk in shower. Pt has hasd reacher, has RTS in two bathrooms, grab bars on kitchen. Has shower chair.   Self-Care   Usual Activity Tolerance moderate   Current Activity Tolerance moderate   Equipment Currently Used at Home cane, straight   Fall history within last six months no   Activity/Exercise/Self-Care Comment Pt IND with mobility and ADL's, but was using SPC last month prior to surgery   Instrumental Activities of Daily Living (IADL)   IADL Comments Will have A from daughter at d/c   General Information   Onset of Illness/Injury or Date of Surgery 03/12/24   Referring Physician Gerard Dial MD   Patient/Family Therapy Goal Statement (OT) To get stronger   Additional Occupational Profile Info/Pertinent History of Current Problem Pt is an 82 y/o female s/p RIGHT TOTAL HIP ARTHROPLASTY on 3/12/24. Pt is POD#1.   Existing Precautions/Restrictions fall;no hip IR;no hip ADD past midline;no pivoting or twisting;90 degree hip flexion   Right Lower Extremity (Weight-bearing Status) weight-bearing as tolerated (WBAT)   Cognitive Status Examination   Orientation Status orientation to person, place and time   Visual Perception   Visual Impairment/Limitations WFL;corrective lenses for reading   Sensory   Sensory Comments Pt does not report numbness/tingling    Pain Assessment   Patient Currently in Pain   (7/10 in RLE)   Range of Motion Comprehensive   Comment, General Range of Motion BUEs WFL   Strength Comprehensive (MMT)   Comment, General Manual Muscle Testing (MMT) Assessment [facility]   Coordination   Upper Extremity Coordination No deficits were identified   Bed Mobility   Comment (Bed Mobility) NT - pt up in chair at time of OT session   Transfers   Transfers sit-stand transfer;toilet transfer   Sit-Stand Transfer   Sit/Stand Transfer Comments SBA   Toilet Transfer   Toilet Transfer Comments SBA per clinical judgement   Balance   Balance Comments No overt LOB noted   Activities of Daily Living   BADL Assessment/Intervention lower body dressing;upper body dressing   Upper Body Dressing Assessment/Training   Comment, (Upper Body Dressing) SBA   Lower Body Dressing Assessment/Training   Comment, (Lower Body Dressing) CGA   Clinical Impression   Criteria for Skilled Therapeutic Interventions Met (OT) Yes, treatment indicated   OT Diagnosis Decreased ind with I/ADLs   OT Problem List-Impairments impacting ADL problems related to;activity tolerance impaired;mobility;pain;post-surgical precautions   Assessment of Occupational Performance 3-5 Performance Deficits   Identified Performance Deficits dressing, bathing, toileting, IADLs   Planned Therapy Interventions (OT) ADL retraining;IADL retraining;transfer training   Clinical Decision Making Complexity (OT) problem focused assessment/low complexity   Risk & Benefits of therapy have been explained patient;daughter   OT Total Evaluation Time   OT Eval, Low Complexity Minutes (16222) 10   Therapy Certification   Medical Diagnosis LESLIE   Start of Care Date 03/13/24   Certification date from 03/13/24   Certification date to 03/13/24   OT Goals   Therapy Frequency (OT) One time eval and treatment   OT Predicted Duration/Target Date for Goal Attainment 03/13/24   OT Goals Upper Body Dressing;Lower Body Dressing;Toilet  Transfer/Toileting   OT: Upper Body Dressing Supervision/stand-by assist   OT: Lower Body Dressing Supervision/stand-by assist;within precautions;using adaptive equipment  (FWW)   OT: Toilet Transfer/Toileting Supervision/stand-by assist;toilet transfer  (FWW)   Self-Care/Home Management   Self-Care/Home Mgmt/ADL, Compensatory, Meal Prep Minutes (67955) 25   Symptoms Noted During/After Treatment (Meal Preparation/Planning Training) fatigue;increased pain   Treatment Detail/Skilled Intervention Pt greeted in chair, daughter present, pt agreeable. Pt edu on posterior hip precautions within ADLs, pt verbalized understanding. Pt edu on use of reacher/sock aid for LB dressing within precautions, pt provided with demonstration. Pt dons pants with CGA, progressing to SBA - FWW, use of reacher and VCs for technique. Pt stands from chair with SBA and FWW, increased time, safe technique. Pt able to pull pants up in standing, FWW - sits back in chair with SBA and FWW, to simulate functional toilet transfer (pt has RTS at home). Pt doffs sock with reacher, dons sock with sock aide with SBA. Pt doffs gown and dons shirt with set up A. Pt edu on walk in shower transfer technique, pt agreeable to sponge bathe initially. Pt provided with ice pack for pain, edu on icing no longer than 20 minutes, keeping layer between ice pack and skin. Pt up in chair with needs met, alarm set, items in reach.   OT Discharge Planning   OT Plan d/c   OT Discharge Recommendation (DC Rec)   (Defer to Ortho MD)   OT Rationale for DC Rec Pt functioning near baseline, limited by post-op precautions, mobility, pain, impacting I/ADLs. Pt currently intermittently A x 1 for self cares. Pt and daughter report daughter plans to stay with pt for a couple of weeks and can provide I/ADL assist. Rec sock aid, pt plans to obtain outside hospital. Pt with no further acute OT needs.   OT Brief overview of current status See above   Total Session Time   Timed Code  Treatment Minutes 25   Total Session Time (sum of timed and untimed services) 35    [facility]  OUTPATIENT OCCUPATIONAL THERAPY  EVALUATION  PLAN OF TREATMENT FOR OUTPATIENT REHABILITATION  (COMPLETE FOR INITIAL CLAIMS ONLY)  Patient's Last Name, First Name, M.I.  YOB: 1940  Charisse Dumas                          Provider's Name  [facility] Medical Record No.  [id number]                             Onset Date:  03/12/24   Start of Care Date:  (P) 03/13/24   Type:     ___PT   _X_OT   ___SLP Medical Diagnosis:  (P) LESLIE                    OT Diagnosis:  Decreased ind with I/ADLs Visits from SOC:  1     See note for plan of treatment, functional goals and certification details    I CERTIFY THE NEED FOR THESE SERVICES FURNISHED UNDER        THIS PLAN OF TREATMENT AND WHILE UNDER MY CARE     (Physician co-signature of this document indicates review and certification of the therapy plan).

## 2024-03-13 NOTE — PLAN OF CARE
Occupational Therapy Discharge Summary    Reason for therapy discharge:    All goals and outcomes met, no further needs identified.    Progress towards therapy goal(s). See goals on Care Plan in [facility] electronic health record for goal details.  Goals met    Therapy recommendation(s):    Pt functioning near baseline, limited by post-op precautions, mobility, pain, impacting I/ADLs. Pt currently intermittently A x 1 for self cares. Pt and daughter report daughter plans to stay with pt for a couple of weeks and can provide I/ADL assist. Rec sock aid, pt plans to obtain outside hospital. Pt with no further acute OT needs.

## 2024-03-13 NOTE — PROGRESS NOTES
Patient vital signs are at baseline: Yes  Patient able to ambulate as they were prior to admission or with assist devices provided by therapies during their stay:  Yes  Patient MUST void prior to discharge:  Yes  Patient able to tolerate oral intake:  Yes  Pain has adequate pain control using Oral analgesics:  Yes  Does patient have an identified :  Yes  Has goal D/C date and time been discussed with patient:  Yes    A&Ox4, Assist of 1 GB/W, tylenol/PO dilaudid for pain, CMS intact, dressing CDI

## 2024-03-13 NOTE — PLAN OF CARE
Patient discharging home with assist of daughter and planning to have home PT. Goals partially met.

## 2024-03-14 NOTE — PLAN OF CARE
Patient discharged home with daughter today. PIV removed, discharge instructions reviewed, questions answered and discharge medications reviewed.

## 2024-03-22 ENCOUNTER — DOCUMENTATION ONLY (OUTPATIENT)
Dept: OTHER | Facility: CLINIC | Age: 84
End: 2024-03-22
Payer: COMMERCIAL

## 2025-03-15 ENCOUNTER — HEALTH MAINTENANCE LETTER (OUTPATIENT)
Age: 85
End: 2025-03-15

## (undated) DEVICE — PREP CHLORAPREP 26ML TINTED ORANGE  260815

## (undated) DEVICE — PREP SKIN SCRUB TRAY 4461A

## (undated) DEVICE — SPONGE LAP 4X18" X8415

## (undated) DEVICE — SOL WATER IRRIG 1000ML BOTTLE 2F7114

## (undated) DEVICE — GLOVE BIOGEL PI SZ 7.5 40875

## (undated) DEVICE — HOOD SURG T7PLUS PEEL AWAY FACE SHIELD STRL LF 0416-801-100

## (undated) DEVICE — HOOD FLYTE W/PEELAWAY 408-800-100

## (undated) DEVICE — GLOVE PROTEXIS BLUE W/NEU-THERA 7.0  2D73EB70

## (undated) DEVICE — PACK TOTAL HIP W/U DRAPE SOP15HUFSC

## (undated) DEVICE — LINEN TOWEL PACK X5 5464

## (undated) DEVICE — SUCTION IRR SYSTEM W/O TIP INTERPULSE HANDPIECE 0210-100-000

## (undated) DEVICE — GLOVE PROTEXIS POWDER FREE 8.0 ORTHOPEDIC 2D73ET80

## (undated) DEVICE — STPL SKIN 35W 6.9MM  PXW35

## (undated) DEVICE — MANIFOLD NEPTUNE 4 PORT 700-20

## (undated) DEVICE — BONE CLEANING TIP INTERPULSE  0210-010-000

## (undated) DEVICE — ESU ELEC BLADE 6" COATED E1450-6

## (undated) DEVICE — DRAPE IOBAN INCISE 23X17" 6650EZ

## (undated) DEVICE — POSITIONER ABDUCTION PILLOW FOAM MED FP-ABDUCTM

## (undated) DEVICE — DRSG ABDOMINAL 07 1/2X8" 7197D

## (undated) DEVICE — ESU GROUND PAD UNIVERSAL W/O CORD

## (undated) DEVICE — SYR 50ML LL W/O NDL 309653

## (undated) DEVICE — SU ETHIBOND 0 CTX CR  8X18" CX31D

## (undated) DEVICE — IMM PILLOW ABDUCT HIP MED 31143061

## (undated) DEVICE — SOL NACL 0.9% INJ 1000ML BAG 2B1324X

## (undated) DEVICE — GLOVE PROTEXIS POWDER FREE 7.5 ORTHOPEDIC 2D73ET75

## (undated) DEVICE — NDL SPINAL 18GA 3.5" 405184

## (undated) DEVICE — DRSG XEROFORM 5X9" 8884431605

## (undated) DEVICE — BLADE SAW SAGITTAL STRK 25X79.5X1.24MM 4/2000 2108-318-000

## (undated) DEVICE — GLOVE PROTEXIS BLUE W/NEU-THERA 8.0  2D73EB80

## (undated) DEVICE — SOLUTION WOUND CLEANSING 3/4OZ 10% PVP EA-L3011FB-50

## (undated) DEVICE — SU VICRYL 2-0 CP-1 27" UND J266H

## (undated) DEVICE — R3 3 HOLE ACETABULAR SHELL 50MM
Type: IMPLANTABLE DEVICE | Site: HIP | Status: NON-FUNCTIONAL
Brand: R3 ACETABULAR

## (undated) DEVICE — GLOVE PROTEXIS POWDER FREE 7.0 ORTHOPEDIC 2D73ET70

## (undated) DEVICE — SU VICRYL 0 CP-1 27" J467H

## (undated) DEVICE — Device

## (undated) DEVICE — DRAIN ROUND W/RESERV KIT JACKSON PRATT 10FR 400ML SU130-402D

## (undated) DEVICE — GLOVE BIOGEL PI MICRO INDICATOR UNDERGLOVE SZ 8.0 48980

## (undated) RX ORDER — PROPOFOL 10 MG/ML
INJECTION, EMULSION INTRAVENOUS
Status: DISPENSED
Start: 2024-03-12

## (undated) RX ORDER — DEXAMETHASONE SODIUM PHOSPHATE 4 MG/ML
INJECTION, SOLUTION INTRA-ARTICULAR; INTRALESIONAL; INTRAMUSCULAR; INTRAVENOUS; SOFT TISSUE
Status: DISPENSED
Start: 2024-03-12

## (undated) RX ORDER — VANCOMYCIN HYDROCHLORIDE 1 G/20ML
INJECTION, POWDER, LYOPHILIZED, FOR SOLUTION INTRAVENOUS
Status: DISPENSED
Start: 2024-03-12

## (undated) RX ORDER — FENTANYL CITRATE 0.05 MG/ML
INJECTION, SOLUTION INTRAMUSCULAR; INTRAVENOUS
Status: DISPENSED
Start: 2024-03-12

## (undated) RX ORDER — TRANEXAMIC ACID 650 MG/1
TABLET ORAL
Status: DISPENSED
Start: 2024-03-12

## (undated) RX ORDER — HYDROMORPHONE HCL IN WATER/PF 6 MG/30 ML
PATIENT CONTROLLED ANALGESIA SYRINGE INTRAVENOUS
Status: DISPENSED
Start: 2024-03-12

## (undated) RX ORDER — LIDOCAINE HYDROCHLORIDE 20 MG/ML
INJECTION, SOLUTION EPIDURAL; INFILTRATION; INTRACAUDAL; PERINEURAL
Status: DISPENSED
Start: 2021-06-29

## (undated) RX ORDER — HYDROMORPHONE HYDROCHLORIDE 1 MG/ML
INJECTION, SOLUTION INTRAMUSCULAR; INTRAVENOUS; SUBCUTANEOUS
Status: DISPENSED
Start: 2021-06-29

## (undated) RX ORDER — TRANEXAMIC ACID 650 MG/1
TABLET ORAL
Status: DISPENSED
Start: 2021-06-29

## (undated) RX ORDER — GLYCOPYRROLATE 0.2 MG/ML
INJECTION, SOLUTION INTRAMUSCULAR; INTRAVENOUS
Status: DISPENSED
Start: 2024-03-12

## (undated) RX ORDER — ONDANSETRON 2 MG/ML
INJECTION INTRAMUSCULAR; INTRAVENOUS
Status: DISPENSED
Start: 2021-06-29

## (undated) RX ORDER — CEFAZOLIN SODIUM 2 G/100ML
INJECTION, SOLUTION INTRAVENOUS
Status: DISPENSED
Start: 2021-06-29

## (undated) RX ORDER — CEFAZOLIN SODIUM/WATER 2 G/20 ML
SYRINGE (ML) INTRAVENOUS
Status: DISPENSED
Start: 2024-03-12

## (undated) RX ORDER — ONDANSETRON 2 MG/ML
INJECTION INTRAMUSCULAR; INTRAVENOUS
Status: DISPENSED
Start: 2024-03-12